# Patient Record
Sex: FEMALE | Race: BLACK OR AFRICAN AMERICAN | NOT HISPANIC OR LATINO | Employment: FULL TIME | URBAN - METROPOLITAN AREA
[De-identification: names, ages, dates, MRNs, and addresses within clinical notes are randomized per-mention and may not be internally consistent; named-entity substitution may affect disease eponyms.]

---

## 2018-01-02 ENCOUNTER — APPOINTMENT (OUTPATIENT)
Dept: WOMENS IMAGING | Facility: HOSPITAL | Age: 43
End: 2018-01-02

## 2018-01-02 PROCEDURE — 77063 BREAST TOMOSYNTHESIS BI: CPT | Performed by: RADIOLOGY

## 2018-01-02 PROCEDURE — 77067 SCR MAMMO BI INCL CAD: CPT | Performed by: RADIOLOGY

## 2019-03-26 ENCOUNTER — APPOINTMENT (OUTPATIENT)
Dept: WOMENS IMAGING | Facility: HOSPITAL | Age: 44
End: 2019-03-26

## 2019-03-26 PROCEDURE — 77067 SCR MAMMO BI INCL CAD: CPT | Performed by: RADIOLOGY

## 2019-03-26 PROCEDURE — 77063 BREAST TOMOSYNTHESIS BI: CPT | Performed by: RADIOLOGY

## 2020-03-25 ENCOUNTER — OFFICE VISIT (OUTPATIENT)
Dept: CARDIOLOGY | Age: 45
End: 2020-03-25

## 2020-03-25 VITALS
DIASTOLIC BLOOD PRESSURE: 85 MMHG | SYSTOLIC BLOOD PRESSURE: 157 MMHG | HEIGHT: 66 IN | BODY MASS INDEX: 36.16 KG/M2 | HEART RATE: 112 BPM | WEIGHT: 225 LBS

## 2020-03-25 DIAGNOSIS — R07.1 CHEST PAIN ON BREATHING: ICD-10-CM

## 2020-03-25 DIAGNOSIS — E78.5 HYPERLIPIDEMIA, UNSPECIFIED HYPERLIPIDEMIA TYPE: ICD-10-CM

## 2020-03-25 DIAGNOSIS — R93.1 ECHOCARDIOGRAM ABNORMAL: Primary | ICD-10-CM

## 2020-03-25 PROCEDURE — 99203 OFFICE O/P NEW LOW 30 MIN: CPT | Performed by: INTERNAL MEDICINE

## 2020-03-25 RX ORDER — PANTOPRAZOLE SODIUM 40 MG/1
TABLET, DELAYED RELEASE ORAL
COMMUNITY
Start: 2018-08-14

## 2020-03-25 RX ORDER — VALACYCLOVIR HYDROCHLORIDE 1 G/1
TABLET, FILM COATED ORAL AS NEEDED
COMMUNITY
Start: 2019-03-26

## 2020-03-25 RX ORDER — ETONOGESTREL AND ETHINYL ESTRADIOL 11.7; 2.7 MG/1; MG/1
INSERT, EXTENDED RELEASE VAGINAL
COMMUNITY
Start: 2018-07-23

## 2020-03-25 RX ORDER — DULOXETIN HYDROCHLORIDE 30 MG/1
30 CAPSULE, DELAYED RELEASE ORAL
COMMUNITY
Start: 2019-03-28 | End: 2020-03-27

## 2020-03-25 NOTE — PROGRESS NOTES
Subjective:        [facility]  Cardiology Consult Note    Patient Identification:  Name: Barbara Pacheco  Age: 44 y.o.  Sex: female  :  1975  MRN: [id number]             CC  SLEEP ALL THE TIME  FOOT SWELLING  H/O DM  SOB  OBESITY    STRONG F/H  HTN  DM FOR 25 YEARS  History of Present Illness:   44-year-old female here for the cardiac evaluation as well as establishment of the care as the patient complaining that the patient has been sleeping all the time, complains of the mild bilateral foot swelling    Patient has history of the diabetes moderately controlled    Barabra Pacheco has been complaining of the shortness of breath mild-to-moderate in intensity with mild-to-moderate usually relieved with rest associated with anxiety and fatigue    Also has obesity and hypertension    Comorbid cardiac risk factors:     Past Medical History:  Past Medical History:   Diagnosis Date   • Abnormal stress echo    • Allergic rhinitis    • Chronic kidney disease    • Diabetes mellitus type 1 (CMS/HCC)    • False positive cardiac stress test    • GERD (gastroesophageal reflux disease)    • HLD (hyperlipidemia)      Past Surgical History:  Past Surgical History:   Procedure Laterality Date   • CARDIAC CATHETERIZATION     •  SECTION     • MOUTH SURGERY        Allergies:  No Known Allergies  Home Meds:    (Not in a hospital admission)  Current Meds:   [unfilled]  Social History:   Social History     Tobacco Use   • Smoking status: Never Smoker   • Smokeless tobacco: Never Used   Substance Use Topics   • Alcohol use: Yes     Comment: occ      Family History:  Family History   Problem Relation Age of Onset   • Diabetes Mother    • Diabetes Father    • Heart attack Father    • Diabetes Sister    • Hyperlipidemia Paternal Grandfather    • Hypertension Paternal Grandfather         Review of Systems    Constitutional: No weakness,fatigue, fever, rigors, chills   Eyes: No vision changes, eye pain   ENT/oropharynx:  No difficulty swallowing, sore throat, epistaxis, changes in hearing   Cardiovascular: No chest pain, chest tightness, palpitations, paroxysmal nocturnal dyspnea, orthopnea, diaphoresis, dizziness / syncopal episode   Respiratory:  Moderate shortness of breath, dyspnea on exertion, cough, wheezing hemoptysis   Gastrointestinal: No abdominal pain, nausea, vomiting, diarrhea, bloody stools   Genitourinary: No hematuria, dysuria   Neurological: No headache, tremors, numbness,  one-sided weakness    Musculoskeletal: No cramps, myalgias,  joint pain, joint swelling   Integument: No rash, edema           Constitutional:  Heart Rate:  [112] 112  BP: (157)/(85) 157/85    Physical Exam   General:  Appears in no acute distress  Eyes: PERTL,  HEENT:  No JVD. Thyroid not visibly enlarged. No mucosal pallor or cyanosis  Respiratory: Respirations regular and unlabored at rest. BBS with good air entry in all fields. No crackles, rubs or wheezes auscultated  Cardiovascular: S1S2 Regular rate and rhythm. No murmur, rub or gallop auscultated. No carotid bruits. DP/PT pulses    . No pretibial pitting edema  Gastrointestinal: Abdomen soft, flat, non tender. Bowel sounds present. No hepatosplenomegaly. No ascites  Musculoskeletal: BLAKELY x4. No abnormal movements  Extremities: No digital clubbing or cyanosis  Skin: Color pink. Skin warm and dry to touch. No rashes  No xanthoma  Neuro: AAO x3 CN II-XII grossly intact          Procedures        Cardiographics  ECG:     Telemetry:    Echocardiogram:     Imaging  Chest X-ray:     Lab Review               @[email or web address]@              Assessment:/ Recommendations / Plan:   Patient Active Problem List   Diagnosis   • Echocardiogram abnormal   • Type 1 diabetes mellitus (CMS/HCC)   • Diabetic retinopathy associated with type 1 diabetes mellitus (CMS/HCC)   • Gastroesophageal reflux disease   • Hyperlipidemia   • Chest pain on breathing                    ICD-10-CM ICD-9-CM   1. Echocardiogram  abnormal R93.1 793.2   2. Hyperlipidemia, unspecified hyperlipidemia type E78.5 272.4   3. Chest pain on breathing R07.1 786.52     1. Echocardiogram abnormal  Considering patient's medical condition as well as the risk factors, patient will require echocardiogram for further evaluation for the LV function, four-chamber evaluation, including the pressures, valvular function and  pericardial disease and pericardial effusion    - Stress Test With Myocardial Perfusion One Day  - Adult Transthoracic Echo Complete W/ Cont if Necessary Per Protocol    2. Hyperlipidemia, unspecified hyperlipidemia type  Continue current treatment  - Stress Test With Myocardial Perfusion One Day  - Adult Transthoracic Echo Complete W/ Cont if Necessary Per Protocol    3. Chest pain on breathing  Considering the patient's symptoms as well as clinical situation and  EKG findings, along with cardiac risk factors, ischemic workup is necessary to rule out ischemic cardiomyopathy, stress induced arrhythmias, and functional capacity for diagnosis as well as prognostic consideration    - Stress Test With Myocardial Perfusion One Day  - Adult Transthoracic Echo Complete W/ Cont if Necessary Per Protocol       STRESS CARDIOLYTE, ECHO  START ZEBETA 5 MG PO DAILY    Pros and cons of this new medication / change medication has been explained to  the patient    Possible side effects has been explained    Associated need of the blood  Work has been explained    Need for the compliance of the medication has been explained      SEE IN 2-4 WKS    Labs/tests ordered for am      Mookie Alejandro MD  3/25/2020, 15:31      EMR Dragon/Transcription:   Dictated utilizing Dragon dictation

## 2020-03-26 RX ORDER — BISOPROLOL FUMARATE 5 MG/1
5 TABLET, FILM COATED ORAL DAILY
Qty: 30 TABLET | Refills: 6 | Status: SHIPPED | OUTPATIENT
Start: 2020-03-26 | End: 2020-10-05

## 2020-03-31 ENCOUNTER — TELEPHONE (OUTPATIENT)
Dept: CARDIOLOGY | Facility: CLINIC | Age: 45
End: 2020-03-31

## 2020-03-31 NOTE — TELEPHONE ENCOUNTER
Patient states that she has not started her new medicaiton. Patient states she is not having any chest pain or discomfort. Denies any shortness of breath. Reports fatigue only.  Instructed patient that given she is asymptomatic will need to reschedule stress testing given the current pandemic.  Strict instructions provided for any new symptoms.   ALP

## 2020-04-01 ENCOUNTER — APPOINTMENT (OUTPATIENT)
Dept: CARDIOLOGY | Facility: HOSPITAL | Age: 45
End: 2020-04-01

## 2020-04-02 ENCOUNTER — APPOINTMENT (OUTPATIENT)
Dept: CARDIOLOGY | Facility: HOSPITAL | Age: 45
End: 2020-04-02

## 2020-06-01 ENCOUNTER — HOSPITAL ENCOUNTER (OUTPATIENT)
Dept: CARDIOLOGY | Facility: HOSPITAL | Age: 45
Discharge: HOME | End: 2020-06-01
Admitting: INTERNAL MEDICINE

## 2020-06-01 ENCOUNTER — HOSPITAL ENCOUNTER (OUTPATIENT)
Dept: CARDIOLOGY | Facility: HOSPITAL | Age: 45
Discharge: HOME | End: 2020-06-01

## 2020-06-01 VITALS — BODY MASS INDEX: 36.16 KG/M2 | WEIGHT: 225 LBS | HEIGHT: 66 IN

## 2020-06-01 VITALS — SYSTOLIC BLOOD PRESSURE: 154 MMHG | DIASTOLIC BLOOD PRESSURE: 95 MMHG | HEART RATE: 113 BPM

## 2020-06-01 LAB
BH CV ECHO MEAS - ACS: 1.8 CM
BH CV ECHO MEAS - AO MAX PG (FULL): 5.1 MMHG
BH CV ECHO MEAS - AO MAX PG: 8.8 MMHG
BH CV ECHO MEAS - AO MEAN PG (FULL): 3.4 MMHG
BH CV ECHO MEAS - AO MEAN PG: 5.7 MMHG
BH CV ECHO MEAS - AO ROOT AREA (BSA CORRECTED): 1.3
BH CV ECHO MEAS - AO ROOT AREA: 6.1 CM^2
BH CV ECHO MEAS - AO ROOT DIAM: 2.8 CM
BH CV ECHO MEAS - AO V2 MAX: 148 CM/SEC
BH CV ECHO MEAS - AO V2 MEAN: 114.6 CM/SEC
BH CV ECHO MEAS - AO V2 VTI: 28.1 CM
BH CV ECHO MEAS - ASC AORTA: 2.3 CM
BH CV ECHO MEAS - AVA(I,A): 1.8 CM^2
BH CV ECHO MEAS - AVA(I,D): 1.8 CM^2
BH CV ECHO MEAS - AVA(V,A): 2.1 CM^2
BH CV ECHO MEAS - AVA(V,D): 2.1 CM^2
BH CV ECHO MEAS - BSA(HAYCOCK): 2.2 M^2
BH CV ECHO MEAS - BSA: 2.1 M^2
BH CV ECHO MEAS - BZI_BMI: 36.3 KILOGRAMS/M^2
BH CV ECHO MEAS - BZI_METRIC_HEIGHT: 167.6 CM
BH CV ECHO MEAS - BZI_METRIC_WEIGHT: 102.1 KG
BH CV ECHO MEAS - EDV(CUBED): 34.8 ML
BH CV ECHO MEAS - EDV(MOD-SP2): 61 ML
BH CV ECHO MEAS - EDV(MOD-SP4): 42 ML
BH CV ECHO MEAS - EDV(TEICH): 43 ML
BH CV ECHO MEAS - EF(CUBED): 73.1 %
BH CV ECHO MEAS - EF(MOD-BP): 64 %
BH CV ECHO MEAS - EF(MOD-SP2): 63.9 %
BH CV ECHO MEAS - EF(MOD-SP4): 64.3 %
BH CV ECHO MEAS - EF(TEICH): 66.2 %
BH CV ECHO MEAS - ESV(CUBED): 9.4 ML
BH CV ECHO MEAS - ESV(MOD-SP2): 22 ML
BH CV ECHO MEAS - ESV(MOD-SP4): 15 ML
BH CV ECHO MEAS - ESV(TEICH): 14.5 ML
BH CV ECHO MEAS - FS: 35.5 %
BH CV ECHO MEAS - IVS/LVPW: 0.91
BH CV ECHO MEAS - IVSD: 1 CM
BH CV ECHO MEAS - LA DIMENSION: 3.4 CM
BH CV ECHO MEAS - LA/AO: 1.2
BH CV ECHO MEAS - LAT PEAK E' VEL: 12.2 CM/SEC
BH CV ECHO MEAS - LV DIASTOLIC VOL/BSA (35-75): 20 ML/M^2
BH CV ECHO MEAS - LV MASS(C)D: 100.5 GRAMS
BH CV ECHO MEAS - LV MASS(C)DI: 47.8 GRAMS/M^2
BH CV ECHO MEAS - LV MAX PG: 3.6 MMHG
BH CV ECHO MEAS - LV MEAN PG: 2.3 MMHG
BH CV ECHO MEAS - LV SYSTOLIC VOL/BSA (12-30): 7.1 ML/M^2
BH CV ECHO MEAS - LV V1 MAX: 95.1 CM/SEC
BH CV ECHO MEAS - LV V1 MEAN: 72.2 CM/SEC
BH CV ECHO MEAS - LV V1 VTI: 16.2 CM
BH CV ECHO MEAS - LVIDD: 3.3 CM
BH CV ECHO MEAS - LVIDS: 2.1 CM
BH CV ECHO MEAS - LVLD AP2: 7.2 CM
BH CV ECHO MEAS - LVLD AP4: 7 CM
BH CV ECHO MEAS - LVLS AP2: 5.5 CM
BH CV ECHO MEAS - LVLS AP4: 5.2 CM
BH CV ECHO MEAS - LVOT AREA (M): 3.1 CM^2
BH CV ECHO MEAS - LVOT AREA: 3.2 CM^2
BH CV ECHO MEAS - LVOT DIAM: 2 CM
BH CV ECHO MEAS - LVPWD: 1.1 CM
BH CV ECHO MEAS - MED PEAK E' VEL: 5.8 CM/SEC
BH CV ECHO MEAS - MV A DUR: 0.15 SEC
BH CV ECHO MEAS - MV A MAX VEL: 121.7 CM/SEC
BH CV ECHO MEAS - MV DEC SLOPE: 522.8 CM/SEC^2
BH CV ECHO MEAS - MV DEC TIME: 0.13 SEC
BH CV ECHO MEAS - MV E MAX VEL: 82.9 CM/SEC
BH CV ECHO MEAS - MV E/A: 0.68
BH CV ECHO MEAS - MV MAX PG: 6.3 MMHG
BH CV ECHO MEAS - MV MEAN PG: 3.4 MMHG
BH CV ECHO MEAS - MV P1/2T MAX VEL: 93 CM/SEC
BH CV ECHO MEAS - MV P1/2T: 52.1 MSEC
BH CV ECHO MEAS - MV V2 MAX: 125.1 CM/SEC
BH CV ECHO MEAS - MV V2 MEAN: 86.4 CM/SEC
BH CV ECHO MEAS - MV V2 VTI: 19.2 CM
BH CV ECHO MEAS - MVA P1/2T LCG: 2.4 CM^2
BH CV ECHO MEAS - MVA(P1/2T): 4.2 CM^2
BH CV ECHO MEAS - MVA(VTI): 2.7 CM^2
BH CV ECHO MEAS - PA ACC TIME: 0.12 SEC
BH CV ECHO MEAS - PA MAX PG (FULL): 2.5 MMHG
BH CV ECHO MEAS - PA MAX PG: 5 MMHG
BH CV ECHO MEAS - PA PR(ACCEL): 26.7 MMHG
BH CV ECHO MEAS - PA V2 MAX: 111.3 CM/SEC
BH CV ECHO MEAS - PULM A REVS DUR: 0.12 SEC
BH CV ECHO MEAS - PULM A REVS VEL: 35.9 CM/SEC
BH CV ECHO MEAS - PULM DIAS VEL: 30.1 CM/SEC
BH CV ECHO MEAS - PULM S/D: 2.1
BH CV ECHO MEAS - PULM SYS VEL: 63.5 CM/SEC
BH CV ECHO MEAS - PVA(V,A): 2.7 CM^2
BH CV ECHO MEAS - PVA(V,D): 2.7 CM^2
BH CV ECHO MEAS - QP/QS: 1.1
BH CV ECHO MEAS - RV BASE (<4.1) - OBSOLETE: 2.5 CM
BH CV ECHO MEAS - RV LENGTH (<8.5) - OBSOLETE: 6 CM
BH CV ECHO MEAS - RV MAX PG: 2.4 MMHG
BH CV ECHO MEAS - RV MEAN PG: 1.5 MMHG
BH CV ECHO MEAS - RV V1 MAX: 78.1 CM/SEC
BH CV ECHO MEAS - RV V1 MEAN: 58.4 CM/SEC
BH CV ECHO MEAS - RV V1 VTI: 14.6 CM
BH CV ECHO MEAS - RVOT AREA: 3.8 CM^2
BH CV ECHO MEAS - RVOT DIAM: 2.2 CM
BH CV ECHO MEAS - SI(AO): 81.9 ML/M^2
BH CV ECHO MEAS - SI(CUBED): 12.1 ML/M^2
BH CV ECHO MEAS - SI(LVOT): 24.6 ML/M^2
BH CV ECHO MEAS - SI(MOD-SP2): 18.5 ML/M^2
BH CV ECHO MEAS - SI(MOD-SP4): 12.8 ML/M^2
BH CV ECHO MEAS - SI(TEICH): 13.6 ML/M^2
BH CV ECHO MEAS - SV(AO): 172.3 ML
BH CV ECHO MEAS - SV(CUBED): 25.5 ML
BH CV ECHO MEAS - SV(LVOT): 51.7 ML
BH CV ECHO MEAS - SV(MOD-SP2): 39 ML
BH CV ECHO MEAS - SV(MOD-SP4): 27 ML
BH CV ECHO MEAS - SV(RVOT): 55.6 ML
BH CV ECHO MEAS - SV(TEICH): 28.5 ML
BH CV ECHO MEAS - TAPSE (>1.6): 2 CM
BH CV ECHO MEASUREMENTS AVERAGE E/E' RATIO: 9.21
BH CV XLRA - RV BASE: 2.5 CM
BH CV XLRA - RV LENGTH: 6 CM
BH CV XLRA - RV MID: 2.4 CM
BH CV XLRA - TDI S': 15.4 CM/SEC
LEFT ATRIUM VOLUME INDEX: 13 ML/M2
MAXIMAL PREDICTED HEART RATE: 175 BPM
STRESS TARGET HR: 149 BPM

## 2020-06-01 PROCEDURE — A9500 TC99M SESTAMIBI: HCPCS | Performed by: INTERNAL MEDICINE

## 2020-06-01 PROCEDURE — 93017 CV STRESS TEST TRACING ONLY: CPT

## 2020-06-01 PROCEDURE — 93016 CV STRESS TEST SUPVJ ONLY: CPT | Performed by: INTERNAL MEDICINE

## 2020-06-01 PROCEDURE — 93306 TTE W/DOPPLER COMPLETE: CPT

## 2020-06-01 PROCEDURE — 78452 HT MUSCLE IMAGE SPECT MULT: CPT | Performed by: INTERNAL MEDICINE

## 2020-06-01 PROCEDURE — 0 TECHNETIUM SESTAMIBI: Performed by: INTERNAL MEDICINE

## 2020-06-01 PROCEDURE — 78452 HT MUSCLE IMAGE SPECT MULT: CPT

## 2020-06-01 PROCEDURE — 93306 TTE W/DOPPLER COMPLETE: CPT | Performed by: INTERNAL MEDICINE

## 2020-06-01 PROCEDURE — 93018 CV STRESS TEST I&R ONLY: CPT | Performed by: INTERNAL MEDICINE

## 2020-06-01 RX ORDER — CHLORHEXIDINE GLUCONATE 0.12 MG/ML
RINSE ORAL
Status: ON HOLD | COMMUNITY
Start: 2020-04-07 | End: 2022-09-29

## 2020-06-01 RX ORDER — DULOXETIN HYDROCHLORIDE 30 MG/1
30 CAPSULE, DELAYED RELEASE ORAL 2 TIMES DAILY
COMMUNITY
Start: 2020-05-07 | End: 2021-10-13

## 2020-06-01 RX ORDER — PROCHLORPERAZINE 25 MG/1
SUPPOSITORY RECTAL SEE ADMIN INSTRUCTIONS
COMMUNITY
Start: 2020-03-31

## 2020-06-01 RX ORDER — AMOXICILLIN 500 MG/1
CAPSULE ORAL
COMMUNITY
Start: 2020-04-07 | End: 2020-06-01

## 2020-06-01 RX ORDER — AMLODIPINE BESYLATE 5 MG/1
TABLET ORAL
COMMUNITY
Start: 2020-04-27 | End: 2020-12-04

## 2020-06-01 RX ORDER — PROCHLORPERAZINE 25 MG/1
SUPPOSITORY RECTAL
COMMUNITY
Start: 2020-03-31

## 2020-06-01 RX ADMIN — TECHNETIUM TC 99M SESTAMIBI 1 DOSE: 1 INJECTION INTRAVENOUS at 11:15

## 2020-06-01 RX ADMIN — TECHNETIUM TC 99M SESTAMIBI 1 DOSE: 1 INJECTION INTRAVENOUS at 08:12

## 2020-06-04 LAB
BH CV STRESS BP STAGE 1: NORMAL
BH CV STRESS BP STAGE 2: NORMAL
BH CV STRESS DURATION MIN STAGE 1: 3
BH CV STRESS DURATION SEC STAGE 1: 0
BH CV STRESS DURATION SEC STAGE 2: 52
BH CV STRESS GRADE STAGE 1: 10
BH CV STRESS GRADE STAGE 2: 10
BH CV STRESS HR STAGE 1: 153
BH CV STRESS HR STAGE 2: 153
BH CV STRESS METS STAGE 1: 4.6
BH CV STRESS METS STAGE 2: 4.8
BH CV STRESS PROTOCOL 1: NORMAL
BH CV STRESS RECOVERY BP: NORMAL MMHG
BH CV STRESS RECOVERY HR: 121 BPM
BH CV STRESS RECOVERY O2: 100 %
BH CV STRESS SPEED STAGE 1: 1.7
BH CV STRESS SPEED STAGE 2: 2
BH CV STRESS STAGE 1: 1
BH CV STRESS STAGE 2: 2
LV EF NUC BP: 60 %
MAXIMAL PREDICTED HEART RATE: 175 BPM
PERCENT MAX PREDICTED HR: 87.43 %
STRESS BASELINE BP: NORMAL MMHG
STRESS BASELINE HR: 112 BPM
STRESS O2 SAT REST: 100 %
STRESS PERCENT HR: 103 %
STRESS POST ESTIMATED WORKLOAD: 4.8 METS
STRESS POST EXERCISE DUR MIN: 3 MIN
STRESS POST EXERCISE DUR SEC: 52 SEC
STRESS POST PEAK BP: NORMAL MMHG
STRESS POST PEAK HR: 153 BPM
STRESS TARGET HR: 149 BPM

## 2020-06-08 ENCOUNTER — OFFICE VISIT (OUTPATIENT)
Dept: CARDIOLOGY | Facility: CLINIC | Age: 45
End: 2020-06-08

## 2020-06-08 VITALS — HEIGHT: 66 IN | WEIGHT: 225 LBS | BODY MASS INDEX: 36.16 KG/M2

## 2020-06-08 DIAGNOSIS — R00.2 PALPITATIONS: ICD-10-CM

## 2020-06-08 DIAGNOSIS — R07.1 CHEST PAIN ON BREATHING: Primary | ICD-10-CM

## 2020-06-08 DIAGNOSIS — I10 ESSENTIAL HYPERTENSION: ICD-10-CM

## 2020-06-08 PROCEDURE — 99442 PR PHYS/QHP TELEPHONE EVALUATION 11-20 MIN: CPT | Performed by: INTERNAL MEDICINE

## 2020-06-08 NOTE — PROGRESS NOTES
You have chosen to receive care through a telephone visit. Do you consent to use a telephone visit for your medical care today? Yes    Results     Subjective:        Barbara Pacheco is a 45 y.o. female who here for follow up    CC  Telephone checkup for the results    Fast heart beat better    Still have fatigue  HPI  45-year-old female with a history of palpitation benign essential arterial hypertension has heart rate beating much better denies any chest pains or tightness in chest     Problem List Items Addressed This Visit        Cardiovascular and Mediastinum    Essential hypertension    Palpitations       Nervous and Auditory    Chest pain on breathing - Primary        .    The following portions of the patient's history were reviewed and updated as appropriate: allergies, current medications, past family history, past medical history, past social history, past surgical history and problem list.    Past Medical History:   Diagnosis Date   • Abnormal stress echo    • Allergic rhinitis    • Chronic kidney disease    • Diabetes mellitus type 1 (CMS/HCC)    • False positive cardiac stress test    • GERD (gastroesophageal reflux disease)    • HLD (hyperlipidemia)      reports that she has never smoked. She has never used smokeless tobacco. She reports that she drinks alcohol. She reports that she does not use drugs.   Family History   Problem Relation Age of Onset   • Diabetes Mother    • Diabetes Father    • Heart attack Father    • Hypertension Father    • Diabetes Sister    • Hyperlipidemia Paternal Grandfather    • Hypertension Paternal Grandfather        Review of Systems  Constitutional: No wt loss, fever, fatigue  Gastrointestinal: No nausea, abdominal pain  Behavioral/Psych: No insomnia or anxiety   Cardiovascular no chest pains or tightness in the chest  Objective:       Physical Exam  Telephone conference only      Procedures      Echocardiogram:        Current Outpatient Medications:   •  amLODIPine  (NORVASC) 5 MG tablet, TAKE 1 TABLET BY MOUTH EVERY DAY, Disp: , Rfl:   •  Biotin 5 MG capsule, Take  by mouth., Disp: , Rfl:   •  bisoprolol (Zebeta) 5 MG tablet, Take 1 tablet by mouth Daily., Disp: 30 tablet, Rfl: 6  •  chlorhexidine (PERIDEX) 0.12 % solution, USE TWICE DAILY ON A COTTON BALL AS DIRECTED, Disp: , Rfl:   •  Continuous Blood Gluc Sensor (DEXCOM G6 SENSOR), USE AS DIRECTED AND CHANGE EVERY 10 DAYS, Disp: , Rfl:   •  Continuous Blood Gluc Transmit (DEXCOM G6 TRANSMITTER) misc, See Admin Instructions., Disp: , Rfl:   •  DULoxetine (CYMBALTA) 30 MG capsule, Take 30 mg by mouth 2 (Two) Times a Day., Disp: , Rfl:   •  etonogestrel-ethinyl estradiol (NuvaRing) 0.12-0.015 MG/24HR vaginal ring, , Disp: , Rfl:   •  insulin aspart (NovoLOG FlexPen) 100 UNIT/ML solution pen-injector sc pen, , Disp: , Rfl:   •  insulin glargine (LANTUS) 100 UNIT/ML injection, Inject 60 Units under the skin daily., Disp: , Rfl:   •  Insulin Infusion Pump device, Inject 2.7 Units/hr under the skin., Disp: , Rfl:   •  pantoprazole (PROTONIX) 40 MG EC tablet, , Disp: , Rfl:   •  pravastatin (PRAVACHOL) 20 MG tablet, Take 20 mg by mouth., Disp: , Rfl:   •  prenatal vitamins (PRENATAL 27-1) 27-1 MG tablet tablet, Take  by mouth., Disp: , Rfl:   •  valACYclovir (VALTREX) 1000 MG tablet, , Disp: , Rfl:   •  valsartan-hydrochlorothiazide (DIOVAN-HCT) 160-12.5 MG per tablet, , Disp: , Rfl: 2   Assessment:        Patient Active Problem List   Diagnosis   • Echocardiogram abnormal   • Type 1 diabetes mellitus (CMS/HCC)   • Diabetic retinopathy associated with type 1 diabetes mellitus (CMS/HCC)   • Gastroesophageal reflux disease   • Hyperlipidemia   • Chest pain on breathing   Interpretation Summary        · Moderate risk for ischemic heart disease.  · Findings consistent with a normal ECG stress test.  · Left ventricular ejection fraction is normal (Calculated EF = 60%).  · Myocardial perfusion imaging indicates a normal myocardial  perfusion study with no evidence of ischemia.  · Impressions are consistent with a low risk study.       Interpretation Summary     · Calculated EF = 64%  · There is no evidence of pericardial effusion.                  Plan:            ICD-10-CM ICD-9-CM   1. Chest pain on breathing R07.1 786.52   2. Essential hypertension I10 401.9   3. Palpitations R00.2 785.1     1. Chest pain on breathing  Atypical    2. Essential hypertension  Blood pressure under control    3. Palpitations  Heart rate much better       Specificity and sensitivity of the stress test/ cardiac workup has been explained. Pt has been explained if  Symptoms continue please go to ER, and further w/p will be required.    Also explained this does not rule out coronary artery disease or the future events, continue to emphasize on risk reductions for coronary artery disease    Pt also advised to contact PCP for other causes of symptoms      This patient has consented to a telehealth visit via telephone. The visit was scheduled as a telephone visit to comply with patient safety concerns in accordance with CDC recommendations.  All vitals recorded within this visit are reported by the patient.  I spent 15 minutes in total including but not limited to the 15 minutes spent in direct conversation with this patient.     6 months  COUNSELING:    Barbara Antonio was given to patient for the following topics: diagnostic results, risk factor reductions, impressions, risks and benefits of treatment options and importance of treatment compliance .       SMOKING COUNSELING:    [unfilled]    Dictated using Dragon dictation

## 2020-08-21 ENCOUNTER — APPOINTMENT (OUTPATIENT)
Dept: WOMENS IMAGING | Facility: HOSPITAL | Age: 45
End: 2020-08-21

## 2020-08-21 PROCEDURE — 77067 SCR MAMMO BI INCL CAD: CPT | Performed by: RADIOLOGY

## 2020-08-21 PROCEDURE — 77063 BREAST TOMOSYNTHESIS BI: CPT | Performed by: RADIOLOGY

## 2020-10-05 RX ORDER — BISOPROLOL FUMARATE 5 MG/1
TABLET, FILM COATED ORAL
Qty: 90 TABLET | Refills: 1 | Status: SHIPPED | OUTPATIENT
Start: 2020-10-05 | End: 2021-01-15 | Stop reason: SDUPTHER

## 2021-01-15 ENCOUNTER — OFFICE VISIT (OUTPATIENT)
Dept: CARDIOLOGY | Facility: CLINIC | Age: 46
End: 2021-01-15

## 2021-01-15 VITALS
HEIGHT: 66 IN | SYSTOLIC BLOOD PRESSURE: 127 MMHG | DIASTOLIC BLOOD PRESSURE: 78 MMHG | BODY MASS INDEX: 38.25 KG/M2 | HEART RATE: 84 BPM | WEIGHT: 238 LBS

## 2021-01-15 DIAGNOSIS — E78.5 HYPERLIPIDEMIA, UNSPECIFIED HYPERLIPIDEMIA TYPE: ICD-10-CM

## 2021-01-15 DIAGNOSIS — I10 ESSENTIAL HYPERTENSION: Primary | ICD-10-CM

## 2021-01-15 DIAGNOSIS — R00.2 PALPITATIONS: ICD-10-CM

## 2021-01-15 PROCEDURE — 99213 OFFICE O/P EST LOW 20 MIN: CPT | Performed by: NURSE PRACTITIONER

## 2021-01-15 RX ORDER — NYSTATIN AND TRIAMCINOLONE ACETONIDE 100000; 1 [USP'U]/G; MG/G
OINTMENT TOPICAL
COMMUNITY
Start: 2020-12-20

## 2021-01-15 RX ORDER — .BETA.-CAROTENE, ASCORBIC ACID, CHOLECALCIFEROL, .ALPHA.-TOCOPHEROL ACETATE, D-, THIAMINE MONONITRATE, RIBOFLAVIN, NIACINAMIDE, PYRIDOXINE HYDROCHLORIDE, FOLIC ACID, CYANOCOBALAMIN, CALCIUM CARBONATE, IRON, MAGNESIUM OXIDE, ZINC OXIDE, CUPRIC OXIDE, IODINE, OMEGA-3 FATTY ACIDS 25-1-400MG
KIT ORAL
COMMUNITY
Start: 2020-11-06 | End: 2021-10-13

## 2021-01-15 RX ORDER — DESONIDE 0.5 MG/G
CREAM TOPICAL
COMMUNITY
Start: 2020-10-13

## 2021-01-15 RX ORDER — HALOBETASOL PROPIONATE AND TAZAROTENE .1; .45 MG/G; MG/G
LOTION TOPICAL
COMMUNITY
Start: 2020-10-13 | End: 2021-10-13

## 2021-01-15 RX ORDER — INSULIN GLARGINE 100 [IU]/ML
60 INJECTION, SOLUTION SUBCUTANEOUS
COMMUNITY

## 2021-01-15 RX ORDER — BISOPROLOL FUMARATE 5 MG/1
5 TABLET, FILM COATED ORAL DAILY
Qty: 90 TABLET | Refills: 1 | Status: SHIPPED | OUTPATIENT
Start: 2021-01-15 | End: 2021-08-13 | Stop reason: SDUPTHER

## 2021-01-15 RX ORDER — FLUCONAZOLE 200 MG/1
TABLET ORAL
COMMUNITY
Start: 2020-12-20 | End: 2021-10-13

## 2021-01-15 NOTE — PROGRESS NOTES
Subjective:        Barbara Pacheco is a 45 y.o. female who here for follow up    No chief complaint on file.  Hyperlipidemia    HPI     Barbara Pacheco is a 45-year-old female, who is new to me.  She has a history which includes GERD, palpitations, hypertension, hyperlipidemia, diabetes mellitus, CKD, and abnormal stress echo.  His echo on 6/1/2020 revealed EF 64%, no evidence of pericardial effusion.  Stress test indicated a normal myocardial perfusion study with no evidence of ischemia       Tthe following portions of the patient's history were reviewed and updated as appropriate: allergies, current medications, past family history, past medical history, past social history, past surgical history and problem list.    Past Medical History:   Diagnosis Date   • Abnormal stress echo    • Allergic rhinitis    • Chronic kidney disease    • Diabetes mellitus type 1 (CMS/HCC)    • False positive cardiac stress test    • GERD (gastroesophageal reflux disease)    • HLD (hyperlipidemia)          reports that she has never smoked. She has never used smokeless tobacco. She reports current alcohol use. She reports that she does not use drugs.     Family History   Problem Relation Age of Onset   • Diabetes Mother    • Diabetes Father    • Heart attack Father    • Hypertension Father    • Diabetes Sister    • Hyperlipidemia Paternal Grandfather    • Hypertension Paternal Grandfather        ROS     Review of Systems  Constitutional: No wt loss, fever, fatigue  Gastrointestinal: No nausea, abdominal pain  Behavioral/Psych: No insomnia or anxiety  Cardiovascular  :Denies chest pain, and shortness      Objective:           Vitals signs and nursing note reviewed.   Constitutional:       Appearance: Well-developed.   HENT:      Head: Normocephalic.      Right Ear: External ear normal.      Left Ear: External ear normal.   Neck:      Musculoskeletal: Normal range of motion.      Vascular: No JVD.   Pulmonary:      Effort: Pulmonary effort  is normal. No respiratory distress.      Breath sounds: Normal breath sounds. No stridor. No rales.   Cardiovascular:      Normal rate. Regular rhythm.      No gallop.   Pulses:     Intact distal pulses.   Abdominal:      General: Bowel sounds are normal. There is no distension.      Palpations: Abdomen is soft.      Tenderness: There is no abdominal tenderness. There is no guarding.   Musculoskeletal: Normal range of motion.         General: No tenderness.   Skin:     General: Skin is warm.   Neurological:      Mental Status: Alert and oriented to person, place, and time.      Deep Tendon Reflexes: Reflexes are normal and symmetric.   Psychiatric:         Judgment: Judgment normal.         Procedures    Interpretation Summary       · Moderate risk for ischemic heart disease.  · Findings consistent with a normal ECG stress test.  · Left ventricular ejection fraction is normal (Calculated EF = 60%).  · Myocardial perfusion imaging indicates a normal myocardial perfusion study with no evidence of ischemia.  · Impressions are consistent with a low risk study.     Asymptomatic for chest pain. ECG is negative for ischemia.   Ectopy:none   B/P: Baseline Hypertensive  134/90, Peak 160/78  Recovery:  1 min 160/80  2 min 160/80  3 min 148/78  4 min 146/84  5 min  141/91  6 min  130/84     HR response: baseline Tachycardia.  , Peak 155 (within 3 minutes), Recovery 151- 121  Beta-blocker therapy prescribed, however patient has not started yet, but Will start today.   Exercise Tolerance: poor  [id number]  Of  20 Benson Scale, 3.5 minutes.     Duke treadmill score 2  Estimates an annual cardiovascular mortality of 1 %  And of 5-year survival of 93 % . Using the Duke score there is an intermediate probability of angiographic coronary disease     Supervised by: Dr. Alejandro        Interpretation Summary    · Calculated EF = 64%  · There is no evidence of pericardial effusion.            Current Outpatient Medications:   •   Biotin 5 MG capsule, Take  by mouth., Disp: , Rfl:   •  bisoprolol (ZEBeta) 5 MG tablet, TAKE 1 TABLET BY MOUTH EVERY DAY, Disp: 90 tablet, Rfl: 1  •  Cetirizine HCl 10 MG capsule, Take 1 tablet by mouth Daily., Disp: , Rfl:   •  chlorhexidine (PERIDEX) 0.12 % solution, USE TWICE DAILY ON A COTTON BALL AS DIRECTED, Disp: , Rfl:   •  Continuous Blood Gluc Sensor (DEXCOM G6 SENSOR), USE AS DIRECTED AND CHANGE EVERY 10 DAYS, Disp: , Rfl:   •  Continuous Blood Gluc Transmit (DEXCOM G6 TRANSMITTER) misc, See Admin Instructions., Disp: , Rfl:   •  desonide (DESOWEN) 0.05 % cream, , Disp: , Rfl:   •  DULoxetine (CYMBALTA) 30 MG capsule, Take 30 mg by mouth 2 (Two) Times a Day., Disp: , Rfl:   •  Duobrii 0.01-0.045 % lotion, , Disp: , Rfl:   •  etonogestrel-ethinyl estradiol (NuvaRing) 0.12-0.015 MG/24HR vaginal ring, , Disp: , Rfl:   •  fluconazole (DIFLUCAN) 200 MG tablet, TAKE ONE TABLET BY MOUTH EVERY OTHER DAY FOR 3 DOSES, Disp: , Rfl:   •  insulin aspart (NovoLOG FlexPen) 100 UNIT/ML solution pen-injector sc pen, , Disp: , Rfl:   •  insulin glargine (LANTUS, SEMGLEE) 100 UNIT/ML injection, Inject 60 Units under the skin into the appropriate area as directed., Disp: , Rfl:   •  Insulin Infusion Pump device, Inject 2.7 Units/hr under the skin., Disp: , Rfl:   •  nystatin-triamcinolone (MYCOLOG) 233137-9.1 UNIT/GM-% ointment, APPLY TWICE DAILY X 4 5DAYS, THEN ONCE DAILY X4 5 DAYS, THEN EVERY OTHER DAY X 4 5D, Disp: , Rfl:   •  pantoprazole (PROTONIX) 40 MG EC tablet, , Disp: , Rfl:   •  pravastatin (PRAVACHOL) 20 MG tablet, Take 20 mg by mouth., Disp: , Rfl:   •  Vjpthp-PcEfoe-Ffsiu-FA-Omega 3 (Duet ) 25-1 & 400 MG misc, 1 OF EACH DAILY, Disp: , Rfl:   •  prenatal vitamins (PRENATAL 27-1) 27-1 MG tablet tablet, Take  by mouth., Disp: , Rfl:   •  progesterone (PROMETRIUM) 100 MG capsule, TAKE 1 2 CAPSULES (100 200 MG) BY ORAL ROUTE ONCE DAILY IN THE EVENING, Disp: , Rfl:   •  valACYclovir (VALTREX) 1000 MG tablet,  , Disp: , Rfl:   •  valsartan-hydrochlorothiazide (DIOVAN-HCT) 160-12.5 MG per tablet, , Disp: , Rfl: 2  •  vitamin D (ERGOCALCIFEROL) 1.25 MG (08478 UT) capsule capsule, Take 50,000 Units by mouth., Disp: , Rfl:      Assessment:        Patient Active Problem List   Diagnosis   • Echocardiogram abnormal   • Type 1 diabetes mellitus (CMS/HCC)   • Diabetic retinopathy associated with type 1 diabetes mellitus (CMS/HCC)   • Gastroesophageal reflux disease   • Hyperlipidemia   • Chest pain on breathing   • Essential hypertension   • Palpitations               Plan:   1.  Hypertension: Blood pressures controlled on current medications.  Refilled her beta-blocker.    Educated patient on exercising for at least 30 minutes a day for 2 to 3 days a week. Importance of controlling hypertension and blood pressure checkup on the regular basis has been explained. Hypertension as a silent killer has been discussed. Risk reduction of the weight and regular exercises to control the hypertension has been explained.    2.  Palpitations: Controlled             No diagnosis found.    There are no diagnoses linked to this encounter.    COUNSELING: Jairo Antonio was given to patient for the following topics: diagnostic results, risk factor reductions, impressions, risks and benefits of treatment options and importance of treatment compliance .       SMOKING COUNSELING: Denies    She will follow-up in 1 year, unless she needs to be seen sooner.    Sincerely,   MARCELA Vega  [facility]  01/15/21  12:04 EST     EMR Dragon/Transcription disclaimer:   Much of this encounter note is an electronic transcription/translation of spoken language to printed text. The electronic translation of spoken language may permit erroneous, or at times, nonsensical words or phrases to be inadvertently transcribed; Although I have reviewed the note for such errors, some may still exist.

## 2021-08-13 RX ORDER — BISOPROLOL FUMARATE 5 MG/1
5 TABLET, FILM COATED ORAL DAILY
Qty: 90 TABLET | Refills: 1 | OUTPATIENT
Start: 2021-08-13

## 2021-08-13 RX ORDER — BISOPROLOL FUMARATE 5 MG/1
5 TABLET, FILM COATED ORAL DAILY
Qty: 90 TABLET | Refills: 1 | Status: SHIPPED | OUTPATIENT
Start: 2021-08-13 | End: 2022-02-23 | Stop reason: SDUPTHER

## 2021-09-14 ENCOUNTER — APPOINTMENT (OUTPATIENT)
Dept: WOMENS IMAGING | Facility: HOSPITAL | Age: 46
End: 2021-09-14

## 2021-09-14 PROCEDURE — 77067 SCR MAMMO BI INCL CAD: CPT | Performed by: RADIOLOGY

## 2021-09-14 PROCEDURE — 77063 BREAST TOMOSYNTHESIS BI: CPT | Performed by: RADIOLOGY

## 2021-10-11 PROBLEM — E10.40 DIABETIC NEUROPATHY ASSOCIATED WITH TYPE 1 DIABETES MELLITUS (HCC): Status: ACTIVE | Noted: 2020-06-24

## 2021-10-11 PROBLEM — M19.90 ARTHRITIS: Status: ACTIVE | Noted: 2017-08-16

## 2021-10-11 PROBLEM — K76.9 LESION OF LIVER: Status: ACTIVE | Noted: 2017-11-28

## 2021-10-11 PROBLEM — R76.8 POSITIVE ANA (ANTINUCLEAR ANTIBODY): Status: ACTIVE | Noted: 2021-08-23

## 2021-10-11 PROBLEM — Z80.0 FAMILY HISTORY OF MALIGNANT NEOPLASM OF DIGESTIVE ORGANS: Status: ACTIVE | Noted: 2017-08-16

## 2021-10-11 PROBLEM — K20.90 ESOPHAGITIS DETERMINED BY BIOPSY: Status: ACTIVE | Noted: 2021-10-11

## 2021-10-11 PROBLEM — E11.40 DIABETIC NEUROPATHY: Status: ACTIVE | Noted: 2020-06-24

## 2021-10-11 PROBLEM — L40.50 PSORIATIC ARTHRITIS: Status: ACTIVE | Noted: 2021-08-23

## 2021-10-11 PROBLEM — K44.9 HIATAL HERNIA: Status: ACTIVE | Noted: 2018-08-14

## 2021-10-11 PROBLEM — I10 HYPERTENSIVE DISORDER: Status: ACTIVE | Noted: 2017-08-16

## 2021-10-11 PROBLEM — E10.22: Status: ACTIVE | Noted: 2021-04-21

## 2021-10-11 PROBLEM — Z96.41 PRESENCE OF INSULIN PUMP: Status: ACTIVE | Noted: 2021-04-21

## 2021-10-12 NOTE — PROGRESS NOTES
Chief Complaint   Patient presents with   • GI Problem         History of Present Illness  Patient is a 46-year-old female who presents today for follow-up. She has a history of esophagitis, cholelithiasis, liver hemangioma.  Most recent EGD was performed March 2019.  Most recent colonoscopy was October 2017.  She has a family history of colon cancer.    Patient presents today for follow-up.  She reports she has been experiencing persistent worsening abdominal bloating.  This has been present over the last 2 years and is constant.  She reports her stomach feels hard and distended.  She denies any significant abdominal pain, nausea or vomiting.  Eating does not seem to make it better or worse.    She reports her bowels have been moving regularly, generally multiple times per day.  She generally goes 2-3 times per day.  She denies any blood in the stool or dark stool.    She continues on pantoprazole for GERD.  She reports she has symptoms intermittently.  Generally symptoms are controlled if she takes the medication but recur promptly when she discontinues it.    She has a history of diabetes, most recent A1c was around 10.  She reports she was also recently found to have iron deficiency. Per review of lab work, CBC was normal August 2021 with hemoglobin of 12.6 however iron level was low at 46.  She had a CT scan performed in 2017 that showed suspected gallbladder stones or sludge.  It also showed liver lesions which were followed up on in 2018 and 2019 and remained stable, felt to be hemangiomas.  Gastric emptying study in 2018 was normal.    You have chosen to receive care through a telephone visit.   Do you consent to use a telephone visit for your medical care today? Yes         Result Review :          Physical Exam - TELEPHONE VISIT      Assessment and Plan    Diagnoses and all orders for this visit:    1. Bloating (Primary)  -     Breath Hydrogen Test; Future    2. Esophagitis    3. Iron deficiency    4.  Gastroesophageal reflux disease with esophagitis without hemorrhage    5. Family history of colon cancer         Discussion  Patient presents today with concerns about worsening abdominal bloating.  She was also recently found to be iron deficient.  She is having some persistent reflux symptoms.  Discussed recommendation for updated EGD and colonoscopy to rule out any source of GI blood loss as well as allow for biopsies to assess for celiac disease.  This will also allow for follow-up of her esophagitis.  Also recommended hydrogen breath test to evaluate for small intestinal bacterial overgrowth which could be contributing to her abdominal bloating.  If this testing is negative and her symptoms persist, we may need to consider updated imaging or consider updated gastric emptying study to evaluate for gastroparesis.      This visit has been rescheduled as a phone visit to comply with patient safety concerns in accordance with CDC recommendations. Total time of discussion was 10 minutes.    Follow Up   Return for Follow up to review results after testing complete.    Patient Instructions   Schedule EGD and colonoscopy for further evaluation.    Schedule hydrogen breath test to assess for small intestinal bacterial overgrowth.    Recommend starting a daily probiotic.  Recommend Align or [employer] available over-the-counter.  Take 1 capsule daily with food.

## 2021-10-13 ENCOUNTER — OFFICE VISIT (OUTPATIENT)
Dept: GASTROENTEROLOGY | Facility: CLINIC | Age: 46
End: 2021-10-13

## 2021-10-13 ENCOUNTER — PREP FOR SURGERY (OUTPATIENT)
Dept: SURGERY | Facility: SURGERY CENTER | Age: 46
End: 2021-10-13

## 2021-10-13 DIAGNOSIS — R14.0 BLOATING: Primary | ICD-10-CM

## 2021-10-13 DIAGNOSIS — E61.1 IRON DEFICIENCY: ICD-10-CM

## 2021-10-13 DIAGNOSIS — K21.00 GASTROESOPHAGEAL REFLUX DISEASE WITH ESOPHAGITIS WITHOUT HEMORRHAGE: ICD-10-CM

## 2021-10-13 DIAGNOSIS — Z80.0 FAMILY HISTORY OF COLON CANCER: ICD-10-CM

## 2021-10-13 DIAGNOSIS — Z12.11 ENCOUNTER FOR SCREENING FOR MALIGNANT NEOPLASM OF COLON: ICD-10-CM

## 2021-10-13 DIAGNOSIS — K20.90 ESOPHAGITIS: ICD-10-CM

## 2021-10-13 PROCEDURE — 99441 PR PHYS/QHP TELEPHONE EVALUATION 5-10 MIN: CPT | Performed by: NURSE PRACTITIONER

## 2021-10-13 RX ORDER — SODIUM CHLORIDE 0.9 % (FLUSH) 0.9 %
10 SYRINGE (ML) INJECTION AS NEEDED
Status: CANCELLED | OUTPATIENT
Start: 2021-10-13

## 2021-10-13 RX ORDER — SODIUM CHLORIDE 0.9 % (FLUSH) 0.9 %
3 SYRINGE (ML) INJECTION EVERY 12 HOURS SCHEDULED
Status: CANCELLED | OUTPATIENT
Start: 2021-10-13

## 2021-10-13 RX ORDER — SODIUM CHLORIDE, SODIUM LACTATE, POTASSIUM CHLORIDE, CALCIUM CHLORIDE 600; 310; 30; 20 MG/100ML; MG/100ML; MG/100ML; MG/100ML
30 INJECTION, SOLUTION INTRAVENOUS CONTINUOUS PRN
Status: CANCELLED | OUTPATIENT
Start: 2021-10-13

## 2021-10-13 NOTE — PATIENT INSTRUCTIONS
Schedule EGD and colonoscopy for further evaluation.    Schedule hydrogen breath test to assess for small intestinal bacterial overgrowth.    Recommend starting a daily probiotic.  Recommend Align or [employer] available over-the-counter.  Take 1 capsule daily with food.

## 2022-02-21 RX ORDER — BISOPROLOL FUMARATE 5 MG/1
TABLET, FILM COATED ORAL
Qty: 90 TABLET | Refills: 1 | OUTPATIENT
Start: 2022-02-21

## 2022-02-23 RX ORDER — BISOPROLOL FUMARATE 5 MG/1
5 TABLET, FILM COATED ORAL DAILY
Qty: 90 TABLET | Refills: 1 | Status: SHIPPED | OUTPATIENT
Start: 2022-02-23 | End: 2022-02-23 | Stop reason: SDUPTHER

## 2022-02-23 RX ORDER — BISOPROLOL FUMARATE 5 MG/1
5 TABLET, FILM COATED ORAL DAILY
Qty: 90 TABLET | Refills: 1 | Status: SHIPPED | OUTPATIENT
Start: 2022-02-23 | End: 2022-03-08 | Stop reason: SDUPTHER

## 2022-03-08 ENCOUNTER — OFFICE VISIT (OUTPATIENT)
Dept: CARDIOLOGY | Facility: CLINIC | Age: 47
End: 2022-03-08

## 2022-03-08 VITALS
HEIGHT: 66 IN | SYSTOLIC BLOOD PRESSURE: 143 MMHG | WEIGHT: 231 LBS | BODY MASS INDEX: 37.12 KG/M2 | HEART RATE: 85 BPM | DIASTOLIC BLOOD PRESSURE: 78 MMHG

## 2022-03-08 DIAGNOSIS — R94.31 ABNORMAL EKG: Primary | ICD-10-CM

## 2022-03-08 DIAGNOSIS — I10 ESSENTIAL HYPERTENSION: ICD-10-CM

## 2022-03-08 DIAGNOSIS — E78.5 HYPERLIPIDEMIA, UNSPECIFIED HYPERLIPIDEMIA TYPE: ICD-10-CM

## 2022-03-08 DIAGNOSIS — E66.01 CLASS 2 SEVERE OBESITY WITH SERIOUS COMORBIDITY AND BODY MASS INDEX (BMI) OF 37.0 TO 37.9 IN ADULT, UNSPECIFIED OBESITY TYPE: ICD-10-CM

## 2022-03-08 PROCEDURE — 99214 OFFICE O/P EST MOD 30 MIN: CPT

## 2022-03-08 PROCEDURE — 93000 ELECTROCARDIOGRAM COMPLETE: CPT

## 2022-03-08 RX ORDER — AMITRIPTYLINE HYDROCHLORIDE 25 MG/1
25 TABLET, FILM COATED ORAL NIGHTLY
COMMUNITY

## 2022-03-08 RX ORDER — BISOPROLOL FUMARATE 5 MG/1
5 TABLET, FILM COATED ORAL DAILY
Qty: 90 TABLET | Refills: 3 | Status: SHIPPED | OUTPATIENT
Start: 2022-03-08 | End: 2023-03-10

## 2022-03-08 NOTE — PROGRESS NOTES
Subjective:        Barbara Pacheco is a 46 y.o. female who here for follow up    Chief Complaint   Patient presents with   • Follow-up     1 yr        HPI    This is a 46-year-old female who is new to this provider.  She has CKD, diabetes type 1, hypertension, hyperlipidemia. Stress test 6/1/2020 myocardial perfusion imaging indicated a normal study with no evidence of ischemia.  Echo 6/1/2020 EF 64%, normal LV function.    The following portions of the patient's history were reviewed and updated as appropriate: allergies, current medications, past family history, past medical history, past social history, past surgical history and problem list.    Past Medical History:   Diagnosis Date   • Abdominal pain    • Abnormal stress echo    • Allergic rhinitis    • Arthritis    • Chest pain    • Chronic kidney disease    • Diabetes mellitus type 1 (HCC)    • Esophagitis    • False positive cardiac stress test    • Family history of colon cancer    • GERD (gastroesophageal reflux disease)    • High blood pressure    • HLD (hyperlipidemia)    • Liver lesion          reports that she has never smoked. She has never used smokeless tobacco. She reports current alcohol use. She reports that she does not use drugs.     Family History   Problem Relation Age of Onset   • Diabetes Mother    • Diabetes Father    • Heart attack Father    • Hypertension Father    • Diabetes Sister    • Ulcerative colitis Sister    • Hyperlipidemia Paternal Grandfather    • Hypertension Paternal Grandfather    • Colon polyps Neg Hx    • Crohn's disease Neg Hx    • Colon cancer Neg Hx    • Irritable bowel syndrome Neg Hx        ROS     Review of Systems  Constitutional: No wt loss, fever, fatigue  Gastrointestinal: No nausea, abdominal pain  Behavioral/Psych: No insomnia or anxiety  Cardiovascular: no chest pain or shortness of breath      Objective:           Vitals and nursing note reviewed.   Constitutional:       Appearance: Well-developed.   HENT:       Head: Normocephalic.      Right Ear: External ear normal.      Left Ear: External ear normal.   Neck:      Vascular: No JVD.   Pulmonary:      Effort: Pulmonary effort is normal. No respiratory distress.      Breath sounds: Normal breath sounds. No stridor. No rales.   Cardiovascular:      Normal rate. Regular rhythm.      No gallop.   Pulses:     Intact distal pulses.   Abdominal:      General: Bowel sounds are normal. There is no distension.      Palpations: Abdomen is soft.      Tenderness: There is no abdominal tenderness. There is no guarding.   Musculoskeletal: Normal range of motion.         General: No tenderness.      Cervical back: Normal range of motion. Skin:     General: Skin is warm.   Neurological:      Mental Status: Alert and oriented to person, place, and time.      Deep Tendon Reflexes: Reflexes are normal and symmetric.   Psychiatric:         Judgment: Judgment normal.           ECG 12 Lead    Date/Time: 3/8/2022 11:06 AM  Performed by: Shari Hale APRN  Authorized by: Shari Hale APRN   Comparison: compared with previous ECG from 3/25/2020  Comparison to previous ECG: Inverted P waves compared to previous  Rhythm comments: junctional, inverted p waves  Rate: normal  BPM: 82  T flattening: all    Clinical impression: abnormal EKG          Interpretation Summary    · Calculated EF = 64%  · There is no evidence of pericardial effusion.    Interpretation Summary       · Moderate risk for ischemic heart disease.  · Findings consistent with a normal ECG stress test.  · Left ventricular ejection fraction is normal (Calculated EF = 60%).  · Myocardial perfusion imaging indicates a normal myocardial perfusion study with no evidence of ischemia.  · Impressions are consistent with a low risk study.           Current Outpatient Medications:   •  amitriptyline (ELAVIL) 25 MG tablet, Take 25 mg by mouth Every Night., Disp: , Rfl:   •  Biotin 5 MG capsule, Take  by mouth., Disp: , Rfl:   •   bisoprolol (ZEBeta) 5 MG tablet, Take 1 tablet by mouth Daily., Disp: 90 tablet, Rfl: 1  •  Cetirizine HCl 10 MG capsule, Take 1 tablet by mouth Daily., Disp: , Rfl:   •  chlorhexidine (PERIDEX) 0.12 % solution, USE TWICE DAILY ON A COTTON BALL AS DIRECTED, Disp: , Rfl:   •  Continuous Blood Gluc Sensor (DEXCOM G6 SENSOR), USE AS DIRECTED AND CHANGE EVERY 10 DAYS, Disp: , Rfl:   •  Continuous Blood Gluc Transmit (DEXCOM G6 TRANSMITTER) misc, See Admin Instructions., Disp: , Rfl:   •  desonide (DESOWEN) 0.05 % cream, , Disp: , Rfl:   •  etonogestrel-ethinyl estradiol (NUVARING) 0.12-0.015 MG/24HR vaginal ring, , Disp: , Rfl:   •  ferrous sulfate 325 (65 FE) MG EC tablet, Take 325 mg by mouth Daily., Disp: , Rfl:   •  glucose blood (Accu-Chek Guide) test strip, CHECK BG QID as instructed, Disp: , Rfl:   •  insulin aspart (novoLOG FLEXPEN) 100 UNIT/ML solution pen-injector sc pen, , Disp: , Rfl:   •  insulin glargine (LANTUS, SEMGLEE) 100 UNIT/ML injection, Inject 60 Units under the skin into the appropriate area as directed., Disp: , Rfl:   •  Insulin Infusion Pump device, Inject 2.7 Units/hr under the skin., Disp: , Rfl:   •  Insulin Lispro (HumaLOG) 100 UNIT/ML solution cartridge, PER INSULIN PUMP MAX 150U QD, Disp: , Rfl:   •  nystatin-triamcinolone (MYCOLOG) 170594-1.1 UNIT/GM-% ointment, APPLY TWICE DAILY X 4 5DAYS, THEN ONCE DAILY X4 5 DAYS, THEN EVERY OTHER DAY X 4 5D, Disp: , Rfl:   •  pantoprazole (PROTONIX) 40 MG EC tablet, , Disp: , Rfl:   •  prenatal vitamins (PRENATAL 27-1) 27-1 MG tablet tablet, Take  by mouth., Disp: , Rfl:   •  valACYclovir (VALTREX) 1000 MG tablet, , Disp: , Rfl:   •  valsartan-hydrochlorothiazide (DIOVAN-HCT) 160-12.5 MG per tablet, , Disp: , Rfl: 2  •  vitamin D (ERGOCALCIFEROL) 1.25 MG (17754 UT) capsule capsule, Take 50,000 Units by mouth., Disp: , Rfl:      Assessment:        Patient Active Problem List   Diagnosis   • Echocardiogram abnormal   • Type 1 diabetes mellitus (HCC)    • Diabetic retinopathy associated with type 1 diabetes mellitus (HCC)   • Gastroesophageal reflux disease   • Hyperlipidemia   • Chest pain on breathing   • Essential hypertension   • Palpitations   • Chronic kidney disease due to type 1 diabetes mellitus (HCC)   • Diabetic neuropathy associated with type 1 diabetes mellitus (HCC)   • Diabetic neuropathy (HCC)   • Family history of malignant neoplasm of digestive organs   • Family history of malignant neoplasm of digestive organs   • Hiatal hernia   • Lesion of liver   • Positive CHELSEY (antinuclear antibody)   • Presence of insulin pump   • Psoriatic arthritis (HCC)   • Diabetic retinopathy associated with type 1 diabetes mellitus (HCC)   • Hypertensive disorder   • Arthritis   • Esophagitis determined by biopsy               Plan:   1.  Hypertension: controlled    Importance of controlling hypertension and blood pressure  checkup on the regular basis has been explained. Hypertension as a silent killer has been discussed. Risk reduction of the weight and regular exercises to control the hypertension has been explained.    2.  Hyperlipidemia: does not take statin, her cholesterol and labs are managed by Dr Mohamud    Risk of the hyperlipidemia, importance of the treatment has been explained. Pros and cons of the statins has been explained. Regular blood workup as well as side effects including the liver failure, myelopathy death has been explained.    3.  Obesity: Body mass index is 37.28 kg/m².     Significant risk of obesity to CAD, HTN has been explained  Advantages of wt reduction has been explained    4. Abnormal ECG: ECG shows junctional rhythm-inverted P waves-changed from previous. She denies any chest pain or shortness of breath, however given her risk factors I will repeat stress and echo    It was explained to the patient that stress testing carries 85% specificity/sensitivity, and does not rule out future cardiac event.  Risks of the procedure were explained  to the patient including shortness of breath, induction of myocardial infarction, and dizziness.  Patient is agreeable to proceeding with stress testing.                No diagnosis found.    There are no diagnoses linked to this encounter.    COUNSELING: Jairo Antonio was given to patient for the following topics: diagnostic results, risk factor reductions, impressions, risks and benefits of treatment options and importance of treatment compliance .       SMOKING COUNSELING: Denies    cardiolite stress and echo, follow up for results    Sincerely,   MARCELA Diggs  [facility]  03/08/22  10:36 EST    EMR Dragon/Transcription disclaimer:   Much of this encounter note is an electronic transcription/translation of spoken language to printed text. The electronic translation of spoken language may permit erroneous, or at times, nonsensical words or phrases to be inadvertently transcribed; Although I have reviewed the note for such errors, some may still exist.

## 2022-03-29 ENCOUNTER — HOSPITAL ENCOUNTER (OUTPATIENT)
Dept: CARDIOLOGY | Facility: HOSPITAL | Age: 47
Discharge: HOME | End: 2022-03-29

## 2022-03-29 VITALS
DIASTOLIC BLOOD PRESSURE: 74 MMHG | SYSTOLIC BLOOD PRESSURE: 126 MMHG | OXYGEN SATURATION: 99 % | HEART RATE: 81 BPM | RESPIRATION RATE: 18 BRPM

## 2022-03-29 VITALS
BODY MASS INDEX: 37.12 KG/M2 | SYSTOLIC BLOOD PRESSURE: 120 MMHG | DIASTOLIC BLOOD PRESSURE: 77 MMHG | HEART RATE: 80 BPM | WEIGHT: 231 LBS | HEIGHT: 66 IN

## 2022-03-29 PROCEDURE — 93018 CV STRESS TEST I&R ONLY: CPT | Performed by: INTERNAL MEDICINE

## 2022-03-29 PROCEDURE — 78452 HT MUSCLE IMAGE SPECT MULT: CPT

## 2022-03-29 PROCEDURE — 25010000002 REGADENOSON 0.4 MG/5ML SOLUTION: Performed by: INTERNAL MEDICINE

## 2022-03-29 PROCEDURE — 93017 CV STRESS TEST TRACING ONLY: CPT

## 2022-03-29 PROCEDURE — 93306 TTE W/DOPPLER COMPLETE: CPT | Performed by: INTERNAL MEDICINE

## 2022-03-29 PROCEDURE — 93306 TTE W/DOPPLER COMPLETE: CPT

## 2022-03-29 PROCEDURE — 0 TECHNETIUM SESTAMIBI: Performed by: INTERNAL MEDICINE

## 2022-03-29 PROCEDURE — 78452 HT MUSCLE IMAGE SPECT MULT: CPT | Performed by: INTERNAL MEDICINE

## 2022-03-29 PROCEDURE — A9500 TC99M SESTAMIBI: HCPCS | Performed by: INTERNAL MEDICINE

## 2022-03-29 RX ADMIN — TECHNETIUM TC 99M SESTAMIBI 1 DOSE: 1 INJECTION INTRAVENOUS at 10:57

## 2022-03-29 RX ADMIN — REGADENOSON 0.4 MG: 0.08 INJECTION, SOLUTION INTRAVENOUS at 10:57

## 2022-03-29 RX ADMIN — TECHNETIUM TC 99M SESTAMIBI 1 DOSE: 1 INJECTION INTRAVENOUS at 08:06

## 2022-03-30 LAB
AORTIC ARCH: 2.4 CM
AORTIC DIMENSIONLESS INDEX: 0.7 (DI)
BH CV ECHO MEAS - ACS: 2.04 CM
BH CV ECHO MEAS - AO MAX PG: 9.2 MMHG
BH CV ECHO MEAS - AO MEAN PG: 4.6 MMHG
BH CV ECHO MEAS - AO ROOT DIAM: 2.8 CM
BH CV ECHO MEAS - AO V2 MAX: 151.3 CM/SEC
BH CV ECHO MEAS - AO V2 VTI: 28.3 CM
BH CV ECHO MEAS - AVA(I,D): 2.09 CM2
BH CV ECHO MEAS - EDV(CUBED): 58 ML
BH CV ECHO MEAS - EDV(MOD-SP2): 58 ML
BH CV ECHO MEAS - EDV(MOD-SP4): 61 ML
BH CV ECHO MEAS - EF(MOD-BP): 65.3 %
BH CV ECHO MEAS - EF(MOD-SP2): 58.6 %
BH CV ECHO MEAS - EF(MOD-SP4): 70.5 %
BH CV ECHO MEAS - ESV(CUBED): 16.4 ML
BH CV ECHO MEAS - ESV(MOD-SP2): 24 ML
BH CV ECHO MEAS - ESV(MOD-SP4): 18 ML
BH CV ECHO MEAS - FS: 34.3 %
BH CV ECHO MEAS - IVS/LVPW: 1.08 CM
BH CV ECHO MEAS - IVSD: 0.97 CM
BH CV ECHO MEAS - LAT PEAK E' VEL: 12.6 CM/SEC
BH CV ECHO MEAS - LV DIASTOLIC VOL/BSA (35-75): 28.7 CM2
BH CV ECHO MEAS - LV MASS(C)D: 109.1 GRAMS
BH CV ECHO MEAS - LV MAX PG: 4.3 MMHG
BH CV ECHO MEAS - LV MEAN PG: 2.08 MMHG
BH CV ECHO MEAS - LV SYSTOLIC VOL/BSA (12-30): 8.5 CM2
BH CV ECHO MEAS - LV V1 MAX: 103.3 CM/SEC
BH CV ECHO MEAS - LV V1 VTI: 19.7 CM
BH CV ECHO MEAS - LVIDD: 3.9 CM
BH CV ECHO MEAS - LVIDS: 2.5 CM
BH CV ECHO MEAS - LVOT AREA: 3 CM2
BH CV ECHO MEAS - LVOT DIAM: 1.95 CM
BH CV ECHO MEAS - LVPWD: 0.9 CM
BH CV ECHO MEAS - MED PEAK E' VEL: 8.5 CM/SEC
BH CV ECHO MEAS - MV A DUR: 0.13 SEC
BH CV ECHO MEAS - MV A MAX VEL: 104.8 CM/SEC
BH CV ECHO MEAS - MV DEC SLOPE: 422.8 CM/SEC2
BH CV ECHO MEAS - MV DEC TIME: 174 MSEC
BH CV ECHO MEAS - MV E MAX VEL: 108.7 CM/SEC
BH CV ECHO MEAS - MV E/A: 1.04
BH CV ECHO MEAS - MV MAX PG: 5.1 MMHG
BH CV ECHO MEAS - MV MEAN PG: 2.33 MMHG
BH CV ECHO MEAS - MV V2 VTI: 33.7 CM
BH CV ECHO MEAS - MVA(VTI): 1.75 CM2
BH CV ECHO MEAS - PA ACC TIME: 0.13 SEC
BH CV ECHO MEAS - PA PR(ACCEL): 22 MMHG
BH CV ECHO MEAS - PA V2 MAX: 91.1 CM/SEC
BH CV ECHO MEAS - PULM A REVS DUR: 0.11 SEC
BH CV ECHO MEAS - PULM A REVS VEL: 37.1 CM/SEC
BH CV ECHO MEAS - PULM DIAS VEL: 47 CM/SEC
BH CV ECHO MEAS - PULM SYS VEL: 69.7 CM/SEC
BH CV ECHO MEAS - RAP SYSTOLE: 3 MMHG
BH CV ECHO MEAS - RV MAX PG: 2.7 MMHG
BH CV ECHO MEAS - RV V1 MAX: 81.5 CM/SEC
BH CV ECHO MEAS - RV V1 VTI: 17.8 CM
BH CV ECHO MEAS - RVOT DIAM: 1.84 CM
BH CV ECHO MEAS - RVSP: 18 MMHG
BH CV ECHO MEAS - SI(MOD-SP2): 16 ML/M2
BH CV ECHO MEAS - SI(MOD-SP4): 20.2 ML/M2
BH CV ECHO MEAS - SV(LVOT): 59.1 ML
BH CV ECHO MEAS - SV(MOD-SP2): 34 ML
BH CV ECHO MEAS - SV(MOD-SP4): 43 ML
BH CV ECHO MEAS - SV(RVOT): 47.4 ML
BH CV ECHO MEAS - TAPSE (>1.6): 2.19 CM
BH CV ECHO MEAS - TR MAX PG: 15.1 MMHG
BH CV ECHO MEAS - TR MAX VEL: 194.1 CM/SEC
BH CV ECHO MEASUREMENTS AVERAGE E/E' RATIO: 10.3
BH CV XLRA - RV BASE: 2.3 CM
BH CV XLRA - RV LENGTH: 6 CM
BH CV XLRA - RV MID: 2.03 CM
BH CV XLRA - TDI S': 15.2 CM/SEC
LEFT ATRIUM VOLUME INDEX: 12.3 ML/M2
MAXIMAL PREDICTED HEART RATE: 174 BPM
SINUS: 2.3 CM
STRESS TARGET HR: 148 BPM

## 2022-03-31 LAB
BH CV REST NUCLEAR ISOTOPE DOSE: 10.9 MCI
BH CV STRESS BP STAGE 1: NORMAL
BH CV STRESS COMMENTS STAGE 1: NORMAL
BH CV STRESS DOSE REGADENOSON STAGE 1: 0.4
BH CV STRESS DURATION MIN STAGE 1: 2
BH CV STRESS DURATION SEC STAGE 1: 13
BH CV STRESS GRADE STAGE 1: 0
BH CV STRESS HR STAGE 1: 126
BH CV STRESS METS STAGE 1: 2
BH CV STRESS NUCLEAR ISOTOPE DOSE: 31.1 MCI
BH CV STRESS PROTOCOL 1: NORMAL
BH CV STRESS RECOVERY BP: NORMAL MMHG
BH CV STRESS RECOVERY HR: 97 BPM
BH CV STRESS RECOVERY O2: 99 %
BH CV STRESS SPEED STAGE 1: 1.4
BH CV STRESS STAGE 1: 1
LV EF NUC BP: 50 %
MAXIMAL PREDICTED HEART RATE: 174 BPM
PERCENT MAX PREDICTED HR: 72.41 %
STRESS BASELINE BP: NORMAL MMHG
STRESS BASELINE HR: 82 BPM
STRESS O2 SAT REST: 99 %
STRESS PERCENT HR: 85 %
STRESS POST ESTIMATED WORKLOAD: 2 METS
STRESS POST EXERCISE DUR MIN: 2 MIN
STRESS POST EXERCISE DUR SEC: 13 SEC
STRESS POST PEAK BP: NORMAL MMHG
STRESS POST PEAK HR: 126 BPM
STRESS TARGET HR: 148 BPM

## 2022-04-04 ENCOUNTER — OFFICE VISIT (OUTPATIENT)
Dept: CARDIOLOGY | Facility: CLINIC | Age: 47
End: 2022-04-04

## 2022-04-04 VITALS
HEART RATE: 86 BPM | HEIGHT: 66 IN | WEIGHT: 229 LBS | BODY MASS INDEX: 36.8 KG/M2 | SYSTOLIC BLOOD PRESSURE: 134 MMHG | DIASTOLIC BLOOD PRESSURE: 79 MMHG

## 2022-04-04 DIAGNOSIS — E78.00 PURE HYPERCHOLESTEROLEMIA: ICD-10-CM

## 2022-04-04 DIAGNOSIS — I10 ESSENTIAL HYPERTENSION: ICD-10-CM

## 2022-04-04 DIAGNOSIS — R00.2 PALPITATIONS: Primary | ICD-10-CM

## 2022-04-04 PROCEDURE — 99213 OFFICE O/P EST LOW 20 MIN: CPT | Performed by: INTERNAL MEDICINE

## 2022-04-04 NOTE — PROGRESS NOTES
TEST RESULTS   Subjective:        Barbara Pacheco is a 46 y.o. female who here for follow up    CC  ABNORMAL EKG  HPI  46 years old female with benign essential arterial hypertension hyperlipidemia and palpitations here for the follow-up with no complaints of chest pains tightness heaviness or the pressure sensation     Problems Addressed this Visit        Cardiac and Vasculature    Hyperlipidemia    Essential hypertension    Palpitations - Primary      Diagnoses       Codes Comments    Palpitations    -  Primary ICD-10-CM: R00.2  ICD-9-CM: 785.1     Essential hypertension     ICD-10-CM: I10  ICD-9-CM: 401.9     Pure hypercholesterolemia     ICD-10-CM: E78.00  ICD-9-CM: 272.0         .Interpretation Summary       · Findings consistent with a normal ECG stress test.  · Left ventricular ejection fraction is normal. (Calculated EF = 50%).  · Myocardial perfusion imaging indicates a normal myocardial perfusion study with no evidence of ischemia.  · Impressions are consistent with a low risk study.  · Small Lateral wall defect seen on polar view appears to be due to artifact    Interpretation Summary    · Estimated right ventricular systolic pressure from tricuspid regurgitation is normal (<35 mmHg).  · Calculated left ventricular EF = 65.3% Estimated left ventricular EF was in agreement with the calculated left ventricular EF.  · Left ventricular diastolic function was normal.  · There is no evidence of pericardial effusion. .         The following portions of the patient's history were reviewed and updated as appropriate: allergies, current medications, past family history, past medical history, past social history, past surgical history and problem list.    Past Medical History:   Diagnosis Date   • Abdominal pain    • Abnormal stress echo    • Allergic rhinitis    • Arthritis    • Chest pain    • Chronic kidney disease    • Diabetes mellitus type 1 (HCC)    • Esophagitis    • False positive cardiac stress test    •  "Family history of colon cancer    • GERD (gastroesophageal reflux disease)    • High blood pressure    • HLD (hyperlipidemia)    • Liver lesion    • Neuropathy 03/29/2022    both feet     reports that she has never smoked. She has never used smokeless tobacco. She reports current alcohol use. She reports that she does not use drugs.   Family History   Problem Relation Age of Onset   • Diabetes Mother    • Diabetes Father    • Heart attack Father    • Hypertension Father    • Diabetes Sister    • Ulcerative colitis Sister    • Hyperlipidemia Paternal Grandfather    • Hypertension Paternal Grandfather    • Colon polyps Neg Hx    • Crohn's disease Neg Hx    • Colon cancer Neg Hx    • Irritable bowel syndrome Neg Hx        Review of Systems  Constitutional: No wt loss, fever, fatigue  Gastrointestinal: No nausea, abdominal pain  Behavioral/Psych: No insomnia or anxiety   Cardiovascular no chest pains or tightness in the chest  Objective:       Physical Exam  /79   Pulse 86   Ht 167.6 cm (66\")   Wt 104 kg (229 lb)   BMI 36.96 kg/m²   General appearance: No acute changes   Neck: Trachea midline; NECK, supple, no thyromegaly or lymphadenopathy   Lungs: Normal size and shape, normal breath sounds, equal distribution of air, no rales and rhonchi   CV: S1-S2 regular, no murmurs, no rub, no gallop   Abdomen: Soft, nontender; no masses , no abnormal abdominal sounds   Extremities: No deformity , normal color , no peripheral edema   Skin: Normal temperature, turgor and texture; no rash, ulcers          Procedures      Echocardiogram:        Current Outpatient Medications:   •  amitriptyline (ELAVIL) 25 MG tablet, Take 25 mg by mouth Every Night., Disp: , Rfl:   •  Biotin 5 MG capsule, Take  by mouth., Disp: , Rfl:   •  bisoprolol (ZEBeta) 5 MG tablet, Take 1 tablet by mouth Daily., Disp: 90 tablet, Rfl: 3  •  Cetirizine HCl 10 MG capsule, Take 1 tablet by mouth Daily., Disp: , Rfl:   •  chlorhexidine (PERIDEX) 0.12 % " solution, USE TWICE DAILY ON A COTTON BALL AS DIRECTED, Disp: , Rfl:   •  Continuous Blood Gluc Sensor (DEXCOM G6 SENSOR), USE AS DIRECTED AND CHANGE EVERY 10 DAYS, Disp: , Rfl:   •  Continuous Blood Gluc Transmit (DEXCOM G6 TRANSMITTER) misc, See Admin Instructions., Disp: , Rfl:   •  desonide (DESOWEN) 0.05 % cream, , Disp: , Rfl:   •  etonogestrel-ethinyl estradiol (NUVARING) 0.12-0.015 MG/24HR vaginal ring, , Disp: , Rfl:   •  ferrous sulfate 325 (65 FE) MG EC tablet, Take 325 mg by mouth Daily., Disp: , Rfl:   •  glucose blood (Accu-Chek Guide) test strip, CHECK BG QID as instructed, Disp: , Rfl:   •  insulin aspart (novoLOG FLEXPEN) 100 UNIT/ML solution pen-injector sc pen, , Disp: , Rfl:   •  insulin glargine (LANTUS, SEMGLEE) 100 UNIT/ML injection, Inject 60 Units under the skin into the appropriate area as directed., Disp: , Rfl:   •  Insulin Infusion Pump device, Inject 2.7 Units/hr under the skin., Disp: , Rfl:   •  Insulin Lispro (HumaLOG) 100 UNIT/ML solution cartridge, PER INSULIN PUMP MAX 150U QD, Disp: , Rfl:   •  nystatin-triamcinolone (MYCOLOG) 221122-8.1 UNIT/GM-% ointment, APPLY TWICE DAILY X 4 5DAYS, THEN ONCE DAILY X4 5 DAYS, THEN EVERY OTHER DAY X 4 5D, Disp: , Rfl:   •  pantoprazole (PROTONIX) 40 MG EC tablet, , Disp: , Rfl:   •  prenatal vitamins (PRENATAL 27-1) 27-1 MG tablet tablet, Take  by mouth., Disp: , Rfl:   •  valACYclovir (VALTREX) 1000 MG tablet, , Disp: , Rfl:   •  valsartan-hydrochlorothiazide (DIOVAN-HCT) 160-12.5 MG per tablet, , Disp: , Rfl: 2  •  vitamin D (ERGOCALCIFEROL) 1.25 MG (14065 UT) capsule capsule, Take 50,000 Units by mouth., Disp: , Rfl:    Assessment:        Patient Active Problem List   Diagnosis   • Echocardiogram abnormal   • Type 1 diabetes mellitus (HCC)   • Diabetic retinopathy associated with type 1 diabetes mellitus (HCC)   • Gastroesophageal reflux disease   • Hyperlipidemia   • Chest pain on breathing   • Essential hypertension   • Palpitations   •  Chronic kidney disease due to type 1 diabetes mellitus (HCC)   • Diabetic neuropathy associated with type 1 diabetes mellitus (HCC)   • Diabetic neuropathy (HCC)   • Family history of malignant neoplasm of digestive organs   • Family history of malignant neoplasm of digestive organs   • Hiatal hernia   • Lesion of liver   • Positive CHELSEY (antinuclear antibody)   • Presence of insulin pump   • Psoriatic arthritis (HCC)   • Diabetic retinopathy associated with type 1 diabetes mellitus (HCC)   • Hypertensive disorder   • Arthritis   • Esophagitis determined by biopsy               Plan:            ICD-10-CM ICD-9-CM   1. Palpitations  R00.2 785.1   2. Essential hypertension  I10 401.9   3. Pure hypercholesterolemia  E78.00 272.0     1. Palpitations  Potation's under control  2. Essential hypertension  Pressure under control    3. Pure hypercholesterolemia  Continue current treatment     Specificity and sensitivity of the stress test/ cardiac workup has been explained. Pt has been explained if  Symptoms continue please go to ER, and further w/p will be required.    Also explained this does not rule out coronary artery disease or the future events, continue to emphasize on risk reductions for coronary artery disease    Pt also advised to contact PCP for other causes of symptoms    COUNSELING:    Barbara Antonio was given to patient for the following topics: diagnostic results, risk factor reductions, impressions, risks and benefits of treatment options and importance of treatment compliance .       SMOKING COUNSELING:    [unfilled]    Dictated using Dragon dictation

## 2022-04-08 PROBLEM — E61.1 IRON DEFICIENCY: Status: ACTIVE | Noted: 2022-04-08

## 2022-04-08 PROBLEM — Z12.11 ENCOUNTER FOR SCREENING FOR MALIGNANT NEOPLASM OF COLON: Status: ACTIVE | Noted: 2022-04-08

## 2022-04-08 PROBLEM — R14.0 BLOATING: Status: ACTIVE | Noted: 2022-04-08

## 2022-06-30 ENCOUNTER — TELEPHONE (OUTPATIENT)
Dept: GASTROENTEROLOGY | Facility: CLINIC | Age: 47
End: 2022-06-30

## 2022-06-30 NOTE — TELEPHONE ENCOUNTER
Pt left message  to reschedule procedure on 7/15/22, I tried to marv back and had to leave a message

## 2022-07-13 ENCOUNTER — APPOINTMENT (OUTPATIENT)
Dept: LAB | Facility: SURGERY CENTER | Age: 47
End: 2022-07-13

## 2022-09-20 ENCOUNTER — APPOINTMENT (OUTPATIENT)
Dept: WOMENS IMAGING | Facility: HOSPITAL | Age: 47
End: 2022-09-20

## 2022-09-20 PROCEDURE — 77063 BREAST TOMOSYNTHESIS BI: CPT | Performed by: RADIOLOGY

## 2022-09-20 PROCEDURE — 77067 SCR MAMMO BI INCL CAD: CPT | Performed by: RADIOLOGY

## 2022-09-26 NOTE — SIGNIFICANT NOTE
Education provided the Patient on the following:    - Nothing to Eat or Drink after MN the night before the procedure    - Avoid red/purple fluids while completing their bowel prep as ordered by physician  -Contact Gastrointerologist office for any questions about specific details regarding colon prep    -You will need to have someone drive you home after your colonoscopy and remain with you for 24 hours after the procedure  - The date of your procedure, your are welcome to have one visitor at bedside or remain within 10-15 minutes of [facility]  -Please wear warm socks when you arrive for your procedure  -Remove all jewelry and leave any valuables before arriving the day of your procedure (all will have to be removed before leaving preop)  -You will need to arrive at 0815 on 9/29 for your procedure    -Feel free to contact us at: [phone] with any additional questions/concerns     RN called Dr. Alejandro's office and requested cardiac clearance and most recent EKG to be faxed to us at [phone].

## 2022-09-29 ENCOUNTER — ANESTHESIA (OUTPATIENT)
Dept: SURGERY | Facility: SURGERY CENTER | Age: 47
End: 2022-09-29

## 2022-09-29 ENCOUNTER — HOSPITAL ENCOUNTER (OUTPATIENT)
Facility: SURGERY CENTER | Age: 47
Setting detail: HOSPITAL OUTPATIENT SURGERY
Discharge: HOME | End: 2022-09-29
Attending: INTERNAL MEDICINE | Admitting: INTERNAL MEDICINE

## 2022-09-29 ENCOUNTER — ANESTHESIA EVENT (OUTPATIENT)
Dept: SURGERY | Facility: SURGERY CENTER | Age: 47
End: 2022-09-29

## 2022-09-29 VITALS
HEIGHT: 66 IN | SYSTOLIC BLOOD PRESSURE: 124 MMHG | BODY MASS INDEX: 33.17 KG/M2 | WEIGHT: 206.4 LBS | DIASTOLIC BLOOD PRESSURE: 73 MMHG | HEART RATE: 77 BPM | OXYGEN SATURATION: 98 % | TEMPERATURE: 98 F | RESPIRATION RATE: 16 BRPM

## 2022-09-29 DIAGNOSIS — Z12.11 ENCOUNTER FOR SCREENING FOR MALIGNANT NEOPLASM OF COLON: ICD-10-CM

## 2022-09-29 DIAGNOSIS — E61.1 IRON DEFICIENCY: ICD-10-CM

## 2022-09-29 DIAGNOSIS — K21.00 GASTROESOPHAGEAL REFLUX DISEASE WITH ESOPHAGITIS WITHOUT HEMORRHAGE: ICD-10-CM

## 2022-09-29 DIAGNOSIS — R14.0 BLOATING: ICD-10-CM

## 2022-09-29 DIAGNOSIS — K20.90 ESOPHAGITIS: ICD-10-CM

## 2022-09-29 DIAGNOSIS — Z80.0 FAMILY HISTORY OF COLON CANCER: ICD-10-CM

## 2022-09-29 PROCEDURE — 25010000002 PROPOFOL 10 MG/ML EMULSION: Performed by: ANESTHESIOLOGY

## 2022-09-29 PROCEDURE — 43239 EGD BIOPSY SINGLE/MULTIPLE: CPT | Performed by: INTERNAL MEDICINE

## 2022-09-29 PROCEDURE — 88305 TISSUE EXAM BY PATHOLOGIST: CPT | Performed by: INTERNAL MEDICINE

## 2022-09-29 PROCEDURE — 45380 COLONOSCOPY AND BIOPSY: CPT | Performed by: INTERNAL MEDICINE

## 2022-09-29 RX ORDER — PROPOFOL 10 MG/ML
VIAL (ML) INTRAVENOUS AS NEEDED
Status: DISCONTINUED | OUTPATIENT
Start: 2022-09-29 | End: 2022-09-29 | Stop reason: SURG

## 2022-09-29 RX ORDER — SODIUM CHLORIDE, SODIUM LACTATE, POTASSIUM CHLORIDE, CALCIUM CHLORIDE 600; 310; 30; 20 MG/100ML; MG/100ML; MG/100ML; MG/100ML
INJECTION, SOLUTION INTRAVENOUS CONTINUOUS PRN
Status: DISCONTINUED | OUTPATIENT
Start: 2022-09-29 | End: 2022-09-29 | Stop reason: SURG

## 2022-09-29 RX ORDER — MAGNESIUM HYDROXIDE 1200 MG/15ML
LIQUID ORAL AS NEEDED
Status: DISCONTINUED | OUTPATIENT
Start: 2022-09-29 | End: 2022-09-29 | Stop reason: HOSPADM

## 2022-09-29 RX ORDER — SODIUM CHLORIDE, SODIUM LACTATE, POTASSIUM CHLORIDE, CALCIUM CHLORIDE 600; 310; 30; 20 MG/100ML; MG/100ML; MG/100ML; MG/100ML
30 INJECTION, SOLUTION INTRAVENOUS CONTINUOUS PRN
Status: DISCONTINUED | OUTPATIENT
Start: 2022-09-29 | End: 2022-09-29 | Stop reason: HOSPADM

## 2022-09-29 RX ORDER — SODIUM CHLORIDE 0.9 % (FLUSH) 0.9 %
10 SYRINGE (ML) INJECTION AS NEEDED
Status: DISCONTINUED | OUTPATIENT
Start: 2022-09-29 | End: 2022-09-29 | Stop reason: HOSPADM

## 2022-09-29 RX ORDER — LIDOCAINE HYDROCHLORIDE 20 MG/ML
INJECTION, SOLUTION INFILTRATION; PERINEURAL AS NEEDED
Status: DISCONTINUED | OUTPATIENT
Start: 2022-09-29 | End: 2022-09-29 | Stop reason: SURG

## 2022-09-29 RX ORDER — SODIUM CHLORIDE 0.9 % (FLUSH) 0.9 %
3 SYRINGE (ML) INJECTION EVERY 12 HOURS SCHEDULED
Status: DISCONTINUED | OUTPATIENT
Start: 2022-09-29 | End: 2022-09-29 | Stop reason: HOSPADM

## 2022-09-29 RX ADMIN — LIDOCAINE HYDROCHLORIDE 40 MG: 20 INJECTION, SOLUTION INFILTRATION; PERINEURAL at 09:23

## 2022-09-29 RX ADMIN — SODIUM CHLORIDE, SODIUM LACTATE, POTASSIUM CHLORIDE, AND CALCIUM CHLORIDE: .6; .31; .03; .02 INJECTION, SOLUTION INTRAVENOUS at 09:22

## 2022-09-29 RX ADMIN — PROPOFOL INJECTABLE EMULSION 200 MCG/KG/MIN: 10 INJECTION, EMULSION INTRAVENOUS at 09:23

## 2022-09-29 RX ADMIN — PROPOFOL 100 MG: 10 INJECTION, EMULSION INTRAVENOUS at 09:23

## 2022-09-29 RX ADMIN — PROPOFOL 50 MG: 10 INJECTION, EMULSION INTRAVENOUS at 09:33

## 2022-09-29 NOTE — H&P
No chief complaint on file.      HPI  Bloating  fe deficiency  Fh crc  screening         Problem List:    Patient Active Problem List   Diagnosis   • Echocardiogram abnormal   • Type 1 diabetes mellitus (HCC)   • Diabetic retinopathy associated with type 1 diabetes mellitus (HCC)   • Gastroesophageal reflux disease   • Hyperlipidemia   • Chest pain on breathing   • Essential hypertension   • Palpitations   • Chronic kidney disease due to type 1 diabetes mellitus (HCC)   • Diabetic neuropathy associated with type 1 diabetes mellitus (HCC)   • Diabetic neuropathy (HCC)   • Family history of malignant neoplasm of digestive organs   • Family history of malignant neoplasm of digestive organs   • Hiatal hernia   • Lesion of liver   • Positive CHELSEY (antinuclear antibody)   • Presence of insulin pump   • Psoriatic arthritis (HCC)   • Diabetic retinopathy associated with type 1 diabetes mellitus (HCC)   • Hypertensive disorder   • Arthritis   • Esophagitis determined by biopsy   • Bloating   • Iron deficiency   • Encounter for screening for malignant neoplasm of colon       Medical History:    Past Medical History:   Diagnosis Date   • Abdominal pain    • Abnormal stress echo    • Allergic rhinitis    • Arthritis    • Chest pain    • Chronic kidney disease    • Diabetes mellitus type 1 (HCC)    • Esophagitis    • False positive cardiac stress test    • Family history of colon cancer    • GERD (gastroesophageal reflux disease)    • High blood pressure    • HLD (hyperlipidemia)    • Liver lesion    • Neuropathy 03/29/2022    both feet        Social History:    Social History     Socioeconomic History   • Marital status: Single   Tobacco Use   • Smoking status: Never Smoker   • Smokeless tobacco: Never Used   Vaping Use   • Vaping Use: Never used   Substance and Sexual Activity   • Alcohol use: Yes     Comment: occ wine    • Drug use: No   • Sexual activity: Defer     Birth control/protection: I.U.D.     Comment: Nuvu ring        Family History:   Family History   Problem Relation Age of Onset   • Diabetes Mother    • Diabetes Father    • Heart attack Father    • Hypertension Father    • Diabetes Sister    • Ulcerative colitis Sister    • Hyperlipidemia Paternal Grandfather    • Hypertension Paternal Grandfather    • Colon polyps Neg Hx    • Crohn's disease Neg Hx    • Colon cancer Neg Hx    • Irritable bowel syndrome Neg Hx        Surgical History:   Past Surgical History:   Procedure Laterality Date   • CARDIAC CATHETERIZATION     •  SECTION      1   • COLONOSCOPY      10/17/2017   • MOUTH SURGERY         No current facility-administered medications for this encounter.    Current Outpatient Medications:   •  amitriptyline (ELAVIL) 25 MG tablet, Take 25 mg by mouth Every Night., Disp: , Rfl:   •  Biotin 5 MG capsule, Take  by mouth., Disp: , Rfl:   •  bisoprolol (ZEBeta) 5 MG tablet, Take 1 tablet by mouth Daily., Disp: 90 tablet, Rfl: 3  •  Cetirizine HCl 10 MG capsule, Take 1 tablet by mouth Daily., Disp: , Rfl:   •  chlorhexidine (PERIDEX) 0.12 % solution, USE TWICE DAILY ON A COTTON BALL AS DIRECTED, Disp: , Rfl:   •  Continuous Blood Gluc Sensor (DEXCOM G6 SENSOR), USE AS DIRECTED AND CHANGE EVERY 10 DAYS, Disp: , Rfl:   •  Continuous Blood Gluc Transmit (DEXCOM G6 TRANSMITTER) misc, See Admin Instructions., Disp: , Rfl:   •  desonide (DESOWEN) 0.05 % cream, , Disp: , Rfl:   •  etonogestrel-ethinyl estradiol (NUVARING) 0.12-0.015 MG/24HR vaginal ring, , Disp: , Rfl:   •  insulin aspart (novoLOG FLEXPEN) 100 UNIT/ML solution pen-injector sc pen, , Disp: , Rfl:   •  insulin glargine (LANTUS, SEMGLEE) 100 UNIT/ML injection, Inject 60 Units under the skin into the appropriate area as directed., Disp: , Rfl:   •  Insulin Infusion Pump device, Inject 2.7 Units/hr under the skin., Disp: , Rfl:   •  Insulin Lispro (HumaLOG) 100 UNIT/ML solution cartridge, PER INSULIN PUMP MAX 150U QD, Disp: , Rfl:   •  nystatin-triamcinolone  (MYCOLOG) 664310-4.1 UNIT/GM-% ointment, APPLY TWICE DAILY X 4 5DAYS, THEN ONCE DAILY X4 5 DAYS, THEN EVERY OTHER DAY X 4 5D, Disp: , Rfl:   •  pantoprazole (PROTONIX) 40 MG EC tablet, , Disp: , Rfl:   •  prenatal vitamins (PRENATAL 27-1) 27-1 MG tablet tablet, Take  by mouth., Disp: , Rfl:   •  valACYclovir (VALTREX) 1000 MG tablet, As Needed., Disp: , Rfl:   •  valsartan-hydrochlorothiazide (DIOVAN-HCT) 160-12.5 MG per tablet, , Disp: , Rfl: 2  •  vitamin D (ERGOCALCIFEROL) 1.25 MG (05430 UT) capsule capsule, Take 50,000 Units by mouth., Disp: , Rfl:     Allergies: No Known Allergies     The following portions of the patient's history were reviewed by me and updated as appropriate: review of systems, allergies, current medications, past family history, past medical history, past social history, past surgical history and problem list.    There were no vitals filed for this visit.    PHYSICAL EXAM:    CONSTITUTIONAL:  today's vital signs reviewed by me  GASTROINTESTINAL: abdomen is soft nontender nondistended with normal active bowel sounds, no masses are appreciated    Assessment/ Plan  Bloating  fe deficiency  Fh crc  Screening    egd ad clonoscopy    Risks and benefits as well as alternatives to endoscopic evaluation were explained to the patient and they voiced understanding and wish to proceed.  These risks include but are not limited to the risk of bleeding, perforation, adverse reaction to sedation, and missed lesions.  The patient was given the opportunity to ask questions prior to the endoscopic procedure.

## 2022-09-29 NOTE — ANESTHESIA PREPROCEDURE EVALUATION
Anesthesia Evaluation     Patient summary reviewed   NPO Solid Status: > 6 hours  NPO Liquid Status: > 2 hours           Airway   Mallampati: II  TM distance: >3 FB  Neck ROM: full  Dental - normal exam     Pulmonary    (-) COPD, asthma, sleep apnea, not a smoker  Cardiovascular     (+) hypertension, hyperlipidemia,   (-) CAD, dysrhythmias, angina      Neuro/Psych  (-) seizures, CVA  GI/Hepatic/Renal/Endo    (+) obesity,  hiatal hernia, GERD well controlled,  renal disease CRI, diabetes mellitus type 1 using insulin,   (-) liver disease, no thyroid disorder    Musculoskeletal     Abdominal    Substance History      OB/GYN          Other                        Anesthesia Plan    ASA 3     MAC       Anesthetic plan, risks, benefits, and alternatives have been provided, discussed and informed consent has been obtained with: patient.        CODE STATUS:

## 2022-09-29 NOTE — ANESTHESIA POSTPROCEDURE EVALUATION
Patient: Barbara Pacheco    Procedure Summary     Date: 09/29/22 Room / Location: [facility]  / [facility]    Anesthesia Start: 0921 Anesthesia Stop: 0956    Procedures:       ESOPHAGOGASTRODUODENOSCOPY WITH BIOPSY (N/A )      COLONOSCOPY FOR SCREENING WITH POLYPECTOMY (N/A ) Diagnosis:       Bloating      Esophagitis      Iron deficiency      Gastroesophageal reflux disease with esophagitis without hemorrhage      Family history of colon cancer      Encounter for screening for malignant neoplasm of colon      (Bloating [R14.0])      (Esophagitis [K20.90])      (Iron deficiency [E61.1])      (Gastroesophageal reflux disease with esophagitis without hemorrhage [K21.00])      (Family history of colon cancer [Z80.0])      (Encounter for screening for malignant neoplasm of colon [Z12.11])    Surgeons: Yandel Merchant MD Provider: Mart Arnold MD    Anesthesia Type: MAC ASA Status: 3          Anesthesia Type: MAC    Vitals  Vitals Value Taken Time   /73 09/29/22 1017   Temp 36.7 °C (98 °F) 09/29/22 0956   Pulse 77 09/29/22 1017   Resp 16 09/29/22 1017   SpO2 98 % 09/29/22 1017           Post Anesthesia Care and Evaluation    Level of consciousness: awake  Pain management: satisfactory to patient    Airway patency: patent  Anesthetic complications: No anesthetic complications  PONV Status: controlled  Cardiovascular status: acceptable  Respiratory status: acceptable  Hydration status: acceptable

## 2022-09-30 ENCOUNTER — TELEPHONE (OUTPATIENT)
Dept: CARDIOLOGY | Facility: CLINIC | Age: 47
End: 2022-09-30

## 2022-09-30 LAB
LAB AP CASE REPORT: NORMAL
LAB AP CLINICAL INFORMATION: NORMAL
PATH REPORT.FINAL DX SPEC: NORMAL
PATH REPORT.GROSS SPEC: NORMAL

## 2022-09-30 NOTE — TELEPHONE ENCOUNTER
----- Message from Bailee De Leon sent at 9/26/2022  2:00 PM EDT -----  Regarding: cardiac clearance  Pt is having colonoscopy and EGD on Thursday Sept. 29 and Dr. Merchant is requesting clearance and her most recent EKG.    Please advise  [phone]  Fax: [phone]    Patient already had procedures without a clearance.

## 2023-03-10 RX ORDER — BISOPROLOL FUMARATE 5 MG/1
TABLET, FILM COATED ORAL
Qty: 90 TABLET | Refills: 0 | Status: SHIPPED | OUTPATIENT
Start: 2023-03-10 | End: 2023-04-07 | Stop reason: SDUPTHER

## 2023-04-07 ENCOUNTER — OFFICE VISIT (OUTPATIENT)
Dept: CARDIOLOGY | Facility: CLINIC | Age: 48
End: 2023-04-07
Payer: COMMERCIAL

## 2023-04-07 VITALS
HEART RATE: 78 BPM | BODY MASS INDEX: 30.53 KG/M2 | HEIGHT: 66 IN | SYSTOLIC BLOOD PRESSURE: 132 MMHG | DIASTOLIC BLOOD PRESSURE: 80 MMHG | WEIGHT: 190 LBS

## 2023-04-07 DIAGNOSIS — I10 ESSENTIAL HYPERTENSION: Primary | ICD-10-CM

## 2023-04-07 DIAGNOSIS — E66.9 OBESITY (BMI 30-39.9): ICD-10-CM

## 2023-04-07 DIAGNOSIS — R00.2 PALPITATIONS: ICD-10-CM

## 2023-04-07 DIAGNOSIS — E78.00 PURE HYPERCHOLESTEROLEMIA: ICD-10-CM

## 2023-04-07 RX ORDER — BISOPROLOL FUMARATE 5 MG/1
5 TABLET, FILM COATED ORAL DAILY
Qty: 90 TABLET | Refills: 3 | Status: SHIPPED | OUTPATIENT
Start: 2023-04-07

## 2023-04-07 NOTE — PROGRESS NOTES
Subjective:        Barbara Pacheco is a 47 y.o. female who here for follow up    Chief Complaint   Patient presents with   • Follow-up     1 yr        HPI    Barbara Pacheco is a 47-year-old female no with hypertension, hyperlipidemia, capitation's, DM type I, CKD.  She follows up in office today for management of palpitations and hypertension.    Echo 3/29/2022 EF 65%, normal LV function.  Stress test 3/29/2022 was a normal myocardial perfusion study with no evidence of ischemia.  Small lateral wall defect seen on polar view appears to be due to artifact.      The following portions of the patient's history were reviewed and updated as appropriate: allergies, current medications, past family history, past medical history, past social history, past surgical history and problem list.    Past Medical History:   Diagnosis Date   • Abdominal pain    • Abnormal stress echo    • Allergic rhinitis    • Arthritis    • Chest pain    • Chronic kidney disease    • Diabetes mellitus type 1    • Esophagitis    • False positive cardiac stress test    • Family history of colon cancer    • GERD (gastroesophageal reflux disease)    • High blood pressure    • HLD (hyperlipidemia)    • Liver lesion    • Neuropathy 03/29/2022    both feet         reports that she has never smoked. She has never used smokeless tobacco. She reports current alcohol use. She reports that she does not use drugs.     Family History   Problem Relation Age of Onset   • Diabetes Mother    • Diabetes Father    • Heart attack Father    • Hypertension Father    • Diabetes Sister    • Ulcerative colitis Sister    • Hyperlipidemia Paternal Grandfather    • Hypertension Paternal Grandfather    • Colon polyps Neg Hx    • Crohn's disease Neg Hx    • Colon cancer Neg Hx    • Irritable bowel syndrome Neg Hx        ROS     Review of Systems  Constitutional: no wt loss, fever, fatigue  Gastrointestinal: no nausea, abdominal pain  Behavioral/Psych: no insomnia or  anxiety  Cardiovascular: no chest pain or shortness of breath      Objective:           Vitals and nursing note reviewed.   Constitutional:       Appearance: Well-developed.   HENT:      Head: Normocephalic.      Right Ear: External ear normal.      Left Ear: External ear normal.   Neck:      Vascular: No JVD.   Pulmonary:      Effort: Pulmonary effort is normal. No respiratory distress.      Breath sounds: Normal breath sounds. No stridor. No rales.   Cardiovascular:      Normal rate. Regular rhythm.      No gallop.   Pulses:     Intact distal pulses.   Abdominal:      General: Bowel sounds are normal. There is no distension.      Palpations: Abdomen is soft.      Tenderness: There is no abdominal tenderness. There is no guarding.   Musculoskeletal: Normal range of motion.         General: No tenderness.      Cervical back: Normal range of motion. Skin:     General: Skin is warm.   Neurological:      Mental Status: Alert and oriented to person, place, and time.      Deep Tendon Reflexes: Reflexes are normal and symmetric.   Psychiatric:         Judgment: Judgment normal.           ECG 12 Lead    Date/Time: 4/7/2023 11:04 AM  Performed by: Shari Hale APRN  Authorized by: Shari Hale APRN   Comparison: compared with previous ECG from 3/8/2022  Similar to previous ECG  Rhythm: sinus rhythm  Rate: normal  BPM: 78  Other findings: non-specific ST-T wave changes    Clinical impression: non-specific ECG          Interpretation Summary    · Calculated EF = 64%  · There is no evidence of pericardial effusion.    Interpretation Summary       · Moderate risk for ischemic heart disease.  · Findings consistent with a normal ECG stress test.  · Left ventricular ejection fraction is normal (Calculated EF = 60%).  · Myocardial perfusion imaging indicates a normal myocardial perfusion study with no evidence of ischemia.  · Impressions are consistent with a low risk study.           Current Outpatient Medications:   •   amitriptyline (ELAVIL) 25 MG tablet, Take 1 tablet by mouth Every Night., Disp: , Rfl:   •  Biotin 5 MG capsule, Take  by mouth., Disp: , Rfl:   •  bisoprolol (ZEBeta) 5 MG tablet, TAKE 1 TABLET BY MOUTH EVERY DAY, Disp: 90 tablet, Rfl: 0  •  Cetirizine HCl 10 MG capsule, Take 1 tablet by mouth Daily., Disp: , Rfl:   •  Continuous Blood Gluc Sensor (DEXCOM G6 SENSOR), USE AS DIRECTED AND CHANGE EVERY 10 DAYS, Disp: , Rfl:   •  Continuous Blood Gluc Transmit (DEXCOM G6 TRANSMITTER) misc, See Admin Instructions., Disp: , Rfl:   •  desonide (DESOWEN) 0.05 % cream, , Disp: , Rfl:   •  etonogestrel-ethinyl estradiol (NUVARING) 0.12-0.015 MG/24HR vaginal ring, , Disp: , Rfl:   •  insulin aspart (novoLOG FLEXPEN) 100 UNIT/ML solution pen-injector sc pen, , Disp: , Rfl:   •  insulin glargine (LANTUS, SEMGLEE) 100 UNIT/ML injection, Inject 60 Units under the skin into the appropriate area as directed., Disp: , Rfl:   •  Insulin Infusion Pump device, Inject 2.7 Units/hr under the skin., Disp: , Rfl:   •  Insulin Lispro (HumaLOG) 100 UNIT/ML solution cartridge, PER INSULIN PUMP MAX 150U QD, Disp: , Rfl:   •  nystatin-triamcinolone (MYCOLOG) 687937-5.1 UNIT/GM-% ointment, APPLY TWICE DAILY X 4 5DAYS, THEN ONCE DAILY X4 5 DAYS, THEN EVERY OTHER DAY X 4 5D, Disp: , Rfl:   •  pantoprazole (PROTONIX) 40 MG EC tablet, , Disp: , Rfl:   •  prenatal vitamins (PRENATAL 27-1) 27-1 MG tablet tablet, Take  by mouth., Disp: , Rfl:   •  Semaglutide,0.25 or 0.5MG/DOS, (OZEMPIC) 2 MG/1.5ML solution pen-injector, Inject 0.5 mg under the skin into the appropriate area as directed 1 (One) Time Per Week., Disp: , Rfl:   •  valACYclovir (VALTREX) 1000 MG tablet, As Needed., Disp: , Rfl:   •  valsartan-hydrochlorothiazide (DIOVAN-HCT) 160-12.5 MG per tablet, , Disp: , Rfl: 2  •  vitamin D (ERGOCALCIFEROL) 1.25 MG (29809 UT) capsule capsule, Take 1 capsule by mouth., Disp: , Rfl:      Assessment:        Patient Active Problem List   Diagnosis   •  Echocardiogram abnormal   • Type 1 diabetes mellitus   • Diabetic retinopathy associated with type 1 diabetes mellitus   • Gastroesophageal reflux disease   • Hyperlipidemia   • Chest pain on breathing   • Essential hypertension   • Palpitations   • Chronic kidney disease due to type 1 diabetes mellitus   • Diabetic neuropathy associated with type 1 diabetes mellitus   • Diabetic neuropathy   • Family history of malignant neoplasm of digestive organs   • Family history of malignant neoplasm of digestive organs   • Hiatal hernia   • Lesion of liver   • Positive CHELSEY (antinuclear antibody)   • Presence of insulin pump   • Psoriatic arthritis   • Diabetic retinopathy associated with type 1 diabetes mellitus   • Hypertensive disorder   • Arthritis   • Esophagitis determined by biopsy   • Bloating   • Iron deficiency   • Encounter for screening for malignant neoplasm of colon               Plan:   1.  Hypertension: 132/80 today in office. refilled bisoprolol.  Continue current management    2.  Hyperlipidemia: She reports her PCP manages her cholesterol labs.  Continue current    3.  Obesity:Body mass index is 31.14 kg/m².  Has lost 16 pounds since last year. Significant risk of obesity to CAD, HTN has been explained  Advantages of wt reduction has been explained      4.  Palpitations: Controlled on beta-blockade                 No diagnosis found.    There are no diagnoses linked to this encounter.    COUNSELING: marcelo Antonio was given to patient for the following topics: diagnostic results, risk factor reductions, impressions, risks and benefits of treatment options and importance of treatment compliance .       SMOKING COUNSELING:  denies    Follow up in 1 year or sooner if needed    Sincerely,   MARCELA Diggs  [facility]  04/07/23  11:04 EDT    EMR Dragon/Transcription disclaimer:   Much of this encounter note is an electronic transcription/translation of spoken language to  printed text. The electronic translation of spoken language may permit erroneous, or at times, nonsensical words or phrases to be inadvertently transcribed; Although I have reviewed the note for such errors, some may still exist.

## 2023-06-12 RX ORDER — BISOPROLOL FUMARATE 5 MG/1
TABLET, FILM COATED ORAL
Qty: 90 TABLET | Refills: 3 | Status: SHIPPED | OUTPATIENT
Start: 2023-06-12

## 2023-11-15 ENCOUNTER — PATIENT MESSAGE (OUTPATIENT)
Dept: GASTROENTEROLOGY | Facility: CLINIC | Age: 48
End: 2023-11-15
Payer: COMMERCIAL

## 2023-11-16 RX ORDER — PANTOPRAZOLE SODIUM 40 MG/1
40 TABLET, DELAYED RELEASE ORAL DAILY
Qty: 90 TABLET | Refills: 0 | Status: SHIPPED | OUTPATIENT
Start: 2023-11-16

## 2024-02-12 RX ORDER — PANTOPRAZOLE SODIUM 40 MG/1
40 TABLET, DELAYED RELEASE ORAL DAILY
Qty: 90 TABLET | Refills: 0 | OUTPATIENT
Start: 2024-02-12

## 2024-03-18 RX ORDER — PANTOPRAZOLE SODIUM 40 MG/1
40 TABLET, DELAYED RELEASE ORAL DAILY
Qty: 90 TABLET | Refills: 0 | OUTPATIENT
Start: 2024-03-18

## 2024-03-29 ENCOUNTER — OFFICE VISIT (OUTPATIENT)
Dept: GASTROENTEROLOGY | Facility: CLINIC | Age: 49
End: 2024-03-29
Payer: COMMERCIAL

## 2024-03-29 VITALS
TEMPERATURE: 98.2 F | BODY MASS INDEX: 32.4 KG/M2 | SYSTOLIC BLOOD PRESSURE: 114 MMHG | DIASTOLIC BLOOD PRESSURE: 72 MMHG | OXYGEN SATURATION: 100 % | HEIGHT: 66 IN | WEIGHT: 201.6 LBS | HEART RATE: 88 BPM

## 2024-03-29 DIAGNOSIS — K21.9 GASTROESOPHAGEAL REFLUX DISEASE WITHOUT ESOPHAGITIS: Primary | ICD-10-CM

## 2024-03-29 DIAGNOSIS — K59.00 CONSTIPATION, UNSPECIFIED CONSTIPATION TYPE: ICD-10-CM

## 2024-03-29 RX ORDER — SEMAGLUTIDE 2.4 MG/.75ML
INJECTION, SOLUTION SUBCUTANEOUS
COMMUNITY
Start: 2024-03-28

## 2024-03-29 RX ORDER — PANTOPRAZOLE SODIUM 40 MG/1
40 TABLET, DELAYED RELEASE ORAL DAILY
Qty: 90 TABLET | Refills: 3 | Status: SHIPPED | OUTPATIENT
Start: 2024-03-29

## 2024-03-29 RX ORDER — BLOOD SUGAR DIAGNOSTIC
STRIP MISCELLANEOUS
COMMUNITY
Start: 2023-11-16

## 2024-03-29 NOTE — PATIENT INSTRUCTIONS
For nausea, bloating and fullness     Na has been helpful for some patients.  Gingerroot capsules can be purchased over-the-counter.    Directions: Na root 500 mg (or 550 mg) capsules, take 1 to 2 capsules 3 times daily before meals, max 6 capsules per day.     For GERD:    Follow antireflux precautions:    Continue pantoprazole 40 mg  once daily prior to first meal of the day; can consider starting to take medication every other day and if no return in heartburn symptoms medication can be stopped after 14 days    Avoiding eating within 3 to 4 hours of bedtime.    Avoid foods that can trigger symptoms which may include:  citrus fruits  spicy foods,  Tomatoes  Red sauces   Chocolate  coffee/tea  caffeinated or carbonated beverages  alcohol    For constipation:    Can start over the counter colace and fiber     We recommend starting a daily fiber supplement such as Metamucil, Benefiber, Citrucel       As well as consuming at least 20 to 30 g of dietary fiber per day  This helps to keep stool soft and formed and easy transit throughout the colon to produce regular and complete bowel movements.  When starting fiber regimen recommend starting with a low-dose and gradually increasing by 1 tablespoon every 7 days as tolerated.  This will help your body acclimate to the higher fiber diet and reduce risk of unwanted side effects that include bloating, gas, abdominal fullness, and cramping  For example: Begin taking 1 tablespoon daily for at least 7 days, if this is not successful in producing regular bowel movements can begin taking 1 tablespoons twice a day, and finally if this is not successful after taking 2 tablespoons for 7 days, can further increase to maximum recommended dosing of 1 tablespoon 3 times per day.  It is important to consume increased amounts of fluid throughout the day to help improve the effectiveness of the fiber supplementation  Avoid gummy formulations of fiber as this can further increase  your risk of the above adverse effects due to artificial sweeteners in the Gummies.  Fiber supplements can take 12 to 72 hours to start working. Make sure to drink 6 to 12 ounces of water or noncarbonated beverage with fiber supplement.

## 2024-03-29 NOTE — PROGRESS NOTES
Chief Complaint   Patient presents with    Heartburn    Constipation           History of Present Illness    Patient is a 48 y.o. who presents to the office for follow up evaluation.  Last in office visit was on  10/13/2021 with MARCELA Duke . Patient has a significant past medical history of GERD with esophagitis, cholelithiasis, liver hemangioma.  Other significant past medical history includes type 1 diabetes mellitus and psoriatic arthritis.    9/29/2022 EGD and Colonoscopy:    Irregular Z-line  Negative for Trivedi's esophagus or EOE  Gastritis  Biopsies consistent with mild chronic inactive gastritis without H. pylori or intestinal metaplasia  Endoscopically normal-appearing duodenum and esophagus    Colonoscopy:     3 mm hyperplastic polyp in the descending colon  Non-bleeding internal hemorrhoids.  Quality of bowel preparation described as good    Next colonoscopy for colon cancer screening recommended at 5-year interval due to 9/29/2027    For GERD, patient continues on pantoprazole 40 mg once daily prior to first meal of the day.  States that when pantoprazole is taken daily symptoms are well-controlled without heartburn, nausea, vomiting, or dysphagia.  However she did miss dosing for 48 hours and did notice a recurrence and heartburn symptoms.    Denies use of NSAIDs, tobacco, with occasional wine consumption.    Bowel habits are described as occurring daily to every other day that are a pasty consistency and difficult to maintain personal hygiene.  She also reports frequent incomplete evacuation of stool sensation.  Denies melena or hematochezia.  She is not currently taking fiber supplementation or bowel regimen at this time.    No unintentional weight loss.    Patient has positive family history of ulcerative colitis in sister    Patient denies known family history of colon polyps, colon cancer       Result Review :       UPPER GI ENDOSCOPY (09/29/2022 09:20)  COLONOSCOPY (09/29/2022  "09:12)  Tissue Pathology Exam (09/29/2022 09:31)     Vital Signs:   /72   Pulse 88   Temp 98.2 °F (36.8 °C)   Ht 166.4 cm (65.5\")   Wt 91.4 kg (201 lb 9.6 oz)   SpO2 100%   BMI 33.04 kg/m²     Body mass index is 33.04 kg/m².     Physical Exam  Vitals reviewed.   Constitutional:       General: She is not in acute distress.     Appearance: Normal appearance. She is not ill-appearing.   Eyes:      General: No scleral icterus.  Pulmonary:      Effort: Pulmonary effort is normal. No respiratory distress.   Abdominal:      General: Bowel sounds are normal. There is no distension.      Palpations: Abdomen is soft. Abdomen is not rigid. There is no mass or pulsatile mass.      Tenderness: There is no abdominal tenderness. There is no guarding or rebound.      Hernia: No hernia is present.   Skin:     Coloration: Skin is not jaundiced.   Neurological:      Mental Status: She is alert and oriented to person, place, and time.   Psychiatric:         Thought Content: Thought content normal.         Judgment: Judgment normal.           Assessment and Plan    Diagnoses and all orders for this visit:    1. Gastroesophageal reflux disease without esophagitis (Primary)  -     pantoprazole (PROTONIX) 40 MG EC tablet; Take 1 tablet by mouth Daily.  Dispense: 90 tablet; Refill: 3    2. Constipation, unspecified constipation type           Discussion:    Patient is a pleasant 48-year-old female who presents today for management of heartburn and constipation.  Discussed recommendations to continue with pantoprazole 40 mg once daily as this is currently controlling symptoms.      We also discussed possible trial off of medication beginning with every other day dosing x 14 days.  Patient is agreeable with this plan and will consider slow titration off medication to see if medication can be discontinued and reduce risk of associated long-term risk including but not limited to vitamin deficiencies that include iron, magnesium, B12 " as well as risk of gastric polyps, C. difficile.     For bowel movements, recommend starting fiber supplementation in hopes of improving stool consistency and completeness of bowel movements as well as Colace 1 capsule nightly to improve bowel frequency and completeness.    She will follow-up in our office in 12 months or sooner for any new or worsening GI concerns.  Informed patient of next colonoscopy for colon cancer screening due in September 2027.  Recall active in EMR.    Patient is agreeable to the outlined above treatment plan.  Verbalizes understanding and will contact office for any new or worsening concerns.  All questions answered and support provided.        Patient Instructions   For nausea, bloating and fullness     Na has been helpful for some patients.  Gingerroot capsules can be purchased over-the-counter.    Directions: Na root 500 mg (or 550 mg) capsules, take 1 to 2 capsules 3 times daily before meals, max 6 capsules per day.     For GERD:    Follow antireflux precautions:    Continue pantoprazole 40 mg  once daily prior to first meal of the day; can consider starting to take medication every other day and if no return in heartburn symptoms medication can be stopped after 14 days    Avoiding eating within 3 to 4 hours of bedtime.    Avoid foods that can trigger symptoms which may include:  citrus fruits  spicy foods,  Tomatoes  Red sauces   Chocolate  coffee/tea  caffeinated or carbonated beverages  alcohol    For constipation:    Can start over the counter colace and fiber     We recommend starting a daily fiber supplement such as Metamucil, Benefiber, Citrucel       As well as consuming at least 20 to 30 g of dietary fiber per day  This helps to keep stool soft and formed and easy transit throughout the colon to produce regular and complete bowel movements.  When starting fiber regimen recommend starting with a low-dose and gradually increasing by 1 tablespoon every 7 days as  tolerated.  This will help your body acclimate to the higher fiber diet and reduce risk of unwanted side effects that include bloating, gas, abdominal fullness, and cramping  For example: Begin taking 1 tablespoon daily for at least 7 days, if this is not successful in producing regular bowel movements can begin taking 1 tablespoons twice a day, and finally if this is not successful after taking 2 tablespoons for 7 days, can further increase to maximum recommended dosing of 1 tablespoon 3 times per day.  It is important to consume increased amounts of fluid throughout the day to help improve the effectiveness of the fiber supplementation  Avoid gummy formulations of fiber as this can further increase your risk of the above adverse effects due to artificial sweeteners in the Gummies.  Fiber supplements can take 12 to 72 hours to start working. Make sure to drink 6 to 12 ounces of water or noncarbonated beverage with fiber supplement.               EMR Dragon/Transcription Disclaimer:  This document has been Dictated utilizing Dragon dictation.

## 2024-04-05 ENCOUNTER — OFFICE VISIT (OUTPATIENT)
Dept: CARDIOLOGY | Facility: CLINIC | Age: 49
End: 2024-04-05
Payer: COMMERCIAL

## 2024-04-05 VITALS
HEIGHT: 66 IN | DIASTOLIC BLOOD PRESSURE: 75 MMHG | HEART RATE: 88 BPM | BODY MASS INDEX: 31.34 KG/M2 | SYSTOLIC BLOOD PRESSURE: 130 MMHG | WEIGHT: 195 LBS

## 2024-04-05 DIAGNOSIS — I10 ESSENTIAL HYPERTENSION: Primary | ICD-10-CM

## 2024-04-05 DIAGNOSIS — R00.2 PALPITATIONS: ICD-10-CM

## 2024-04-05 DIAGNOSIS — E78.00 PURE HYPERCHOLESTEROLEMIA: ICD-10-CM

## 2024-04-05 PROCEDURE — 93000 ELECTROCARDIOGRAM COMPLETE: CPT

## 2024-04-05 PROCEDURE — 99214 OFFICE O/P EST MOD 30 MIN: CPT

## 2024-04-05 RX ORDER — BISOPROLOL FUMARATE 5 MG/1
5 TABLET, FILM COATED ORAL DAILY
Qty: 90 TABLET | Refills: 3 | Status: SHIPPED | OUTPATIENT
Start: 2024-04-05

## 2024-04-05 NOTE — PROGRESS NOTES
Subjective:        Barbara Pacheco is a 48 y.o. female who here for follow up    No chief complaint on file.      HPI  .  This is a 48-year-old female who is known with this provider with hypertension, hyperlipidemia, palpitations, diabetes mellitus type 1, CKD.  She follows up in office today for management of palpitations and hypertension.    Reviewed and still relevant: Echo 3/29/2022 EF 65%, normal LV function.  Stress test 3/29/2022 was a normal myocardial perfusion study with no evidence of ischemia.  Small lateral wall defect seen on polar view appears to be due to artifact.    The following portions of the patient's history were reviewed and updated as appropriate: allergies, current medications, past family history, past medical history, past social history, past surgical history and problem list.    Past Medical History:   Diagnosis Date    Abdominal pain     Abnormal stress echo     Allergic rhinitis     Arthritis     Chest pain     Chronic kidney disease     Diabetes mellitus type 1     Esophagitis     False positive cardiac stress test     Family history of colon cancer     GERD (gastroesophageal reflux disease)     High blood pressure     HLD (hyperlipidemia)     Liver lesion     Neuropathy 03/29/2022    both feet         reports that she has never smoked. She has never used smokeless tobacco. She reports current alcohol use. She reports that she does not use drugs.     Family History   Problem Relation Age of Onset    Diabetes Mother     Diabetes Father     Heart attack Father     Hypertension Father     Diabetes Sister     Ulcerative colitis Sister     Hyperlipidemia Paternal Grandfather     Hypertension Paternal Grandfather     Colon polyps Neg Hx     Crohn's disease Neg Hx     Colon cancer Neg Hx     Irritable bowel syndrome Neg Hx        ROS     Review of Systems  Constitutional: No wt loss, fever, fatigue  Gastrointestinal: No nausea, abdominal pain  Behavioral/Psych: No insomnia or  anxiety  Cardiovascular: No chest pain or shortness of breath      Objective:           Vitals and nursing note reviewed.   Constitutional:       Appearance: Well-developed.   HENT:      Head: Normocephalic.      Right Ear: External ear normal.      Left Ear: External ear normal.   Neck:      Vascular: No JVD.   Pulmonary:      Effort: Pulmonary effort is normal. No respiratory distress.      Breath sounds: Normal breath sounds. No stridor. No rales.   Cardiovascular:      Normal rate. Regular rhythm.      No gallop.    Pulses:     Intact distal pulses.   Edema:     Peripheral edema absent.   Abdominal:      General: Bowel sounds are normal. There is no distension.      Palpations: Abdomen is soft.      Tenderness: There is no abdominal tenderness. There is no guarding.   Musculoskeletal: Normal range of motion.         General: No tenderness.      Cervical back: Normal range of motion. Skin:     General: Skin is warm.   Neurological:      Mental Status: Alert and oriented to person, place, and time.      Deep Tendon Reflexes: Reflexes are normal and symmetric.   Psychiatric:         Judgment: Judgment normal.           ECG 12 Lead    Date/Time: 4/5/2024 2:54 PM  Performed by: Shari Hale APRN    Authorized by: Shari Hale APRN  Comparison: compared with previous ECG from 4/7/2023  Similar to previous ECG  Rhythm: sinus rhythm  Rate: normal  BPM: 83    Clinical impression: normal ECG        Interpretation Summary    Calculated EF = 64%  There is no evidence of pericardial effusion.    Interpretation Summary       Moderate risk for ischemic heart disease.  Findings consistent with a normal ECG stress test.  Left ventricular ejection fraction is normal (Calculated EF = 60%).  Myocardial perfusion imaging indicates a normal myocardial perfusion study with no evidence of ischemia.  Impressions are consistent with a low risk study.           Current Outpatient Medications:     amitriptyline (ELAVIL) 25 MG  tablet, Take 1 tablet by mouth Every Night., Disp: , Rfl:     Biotin 5 MG capsule, Take  by mouth., Disp: , Rfl:     bisoprolol (ZEBeta) 5 MG tablet, TAKE 1 TABLET BY MOUTH EVERY DAY, Disp: 90 tablet, Rfl: 3    Cetirizine HCl 10 MG capsule, Take 1 tablet by mouth Daily., Disp: , Rfl:     Continuous Blood Gluc Sensor (DEXCOM G6 SENSOR), USE AS DIRECTED AND CHANGE EVERY 10 DAYS, Disp: , Rfl:     Continuous Blood Gluc Transmit (DEXCOM G6 TRANSMITTER) misc, See Admin Instructions., Disp: , Rfl:     desonide (DESOWEN) 0.05 % cream, , Disp: , Rfl:     etonogestrel-ethinyl estradiol (NUVARING) 0.12-0.015 MG/24HR vaginal ring, , Disp: , Rfl:     glucose blood (OneTouch Ultra) test strip, Use to CBG QID E10.9, Disp: , Rfl:     insulin glargine (LANTUS, SEMGLEE) 100 UNIT/ML injection, Inject 60 Units under the skin into the appropriate area as directed., Disp: , Rfl:     Insulin Infusion Pump device, Inject 2.7 Units/hr under the skin., Disp: , Rfl:     Insulin Lispro (HumaLOG) 100 UNIT/ML solution cartridge, Patient's insurance switches back and forth from Novolog to Humalog, Disp: , Rfl:     nystatin-triamcinolone (MYCOLOG) 803335-3.1 UNIT/GM-% ointment, APPLY TWICE DAILY X 4 5DAYS, THEN ONCE DAILY X4 5 DAYS, THEN EVERY OTHER DAY X 4 5D, Disp: , Rfl:     pantoprazole (PROTONIX) 40 MG EC tablet, Take 1 tablet by mouth Daily., Disp: 90 tablet, Rfl: 3    prenatal vitamins (PRENATAL 27-1) 27-1 MG tablet tablet, Take  by mouth., Disp: , Rfl:     Semaglutide,0.25 or 0.5MG/DOS, (OZEMPIC) 2 MG/1.5ML solution pen-injector, Inject 0.5 mg under the skin into the appropriate area as directed 1 (One) Time Per Week. Patient on last two months of Ozempic.  Insurance will no longer cover it.  Trying to obtain approval for Wegovy., Disp: , Rfl:     Semaglutide-Weight Management (Wegovy) 2.4 MG/0.75ML solution auto-injector, Use 2.4 mg once weekly, Disp: , Rfl:     valACYclovir (VALTREX) 1000 MG tablet, As Needed., Disp: , Rfl:      valsartan-hydrochlorothiazide (DIOVAN-HCT) 160-12.5 MG per tablet, , Disp: , Rfl: 2    vitamin D (ERGOCALCIFEROL) 1.25 MG (93907 UT) capsule capsule, Take 1 capsule by mouth., Disp: , Rfl:      Assessment:        Patient Active Problem List   Diagnosis    Echocardiogram abnormal    Type 1 diabetes mellitus    Diabetic retinopathy associated with type 1 diabetes mellitus    Gastroesophageal reflux disease    Hyperlipidemia    Chest pain on breathing    Essential hypertension    Palpitations    Chronic kidney disease due to type 1 diabetes mellitus    Diabetic neuropathy associated with type 1 diabetes mellitus    Diabetic neuropathy    Family history of malignant neoplasm of digestive organs    Hiatal hernia    Lesion of liver    Positive CHELSEY (antinuclear antibody)    Presence of insulin pump    Psoriatic arthritis    Diabetic retinopathy associated with type 1 diabetes mellitus    Hypertensive disorder    Arthritis    Esophagitis determined by biopsy    Bloating    Iron deficiency    Encounter for screening for malignant neoplasm of colon    Constipation               Plan:   1.  Hypertension: BP today in office 130/75, Controlled.  Refilled bisoprolol, valsartan HCTZ.    Importance of controlling hypertension and blood pressure  checkup on the regular basis has been explained. Hypertension as a silent killer has been discussed. Risk reduction of the weight and regular exercises to control the hypertension has been explained.    2.  Hyperlipidemia: She reports her PCP manages her cholesterol labs, continue current management.    Risk of the hyperlipidemia, importance of the treatment has been explained. Pros and cons of the statins has been explained. Regular blood workup as well as side effects including the liver failure, myelopathy death has been explained.    3.  Palpitations: Controlled on beta-blockade.                 No diagnosis found.    There are no diagnoses linked to this encounter.    COUNSELING:  marcelo Antonio was given to patient for the following topics: diagnostic results, risk factor reductions, impressions, risks and benefits of treatment options and importance of treatment compliance .       SMOKING COUNSELING: denies    Follow up in 1 year or sooner if needed    Sincerely,   MARCELA Diggs  [facility]  04/05/24  14:29 EDT    EMR Dragon/Transcription disclaimer:   Much of this encounter note is an electronic transcription/translation of spoken language to printed text. The electronic translation of spoken language may permit erroneous, or at times, nonsensical words or phrases to be inadvertently transcribed; Although I have reviewed the note for such errors, some may still exist.

## 2024-11-13 ENCOUNTER — TELEPHONE (OUTPATIENT)
Dept: GASTROENTEROLOGY | Facility: CLINIC | Age: 49
End: 2024-11-13
Payer: COMMERCIAL

## 2025-02-17 DIAGNOSIS — K21.9 GASTROESOPHAGEAL REFLUX DISEASE WITHOUT ESOPHAGITIS: ICD-10-CM

## 2025-02-17 RX ORDER — PANTOPRAZOLE SODIUM 40 MG/1
40 TABLET, DELAYED RELEASE ORAL DAILY
Qty: 90 TABLET | Refills: 0 | Status: SHIPPED | OUTPATIENT
Start: 2025-02-17

## 2025-04-07 ENCOUNTER — OFFICE VISIT (OUTPATIENT)
Dept: CARDIOLOGY | Facility: CLINIC | Age: 50
End: 2025-04-07
Payer: COMMERCIAL

## 2025-04-07 VITALS
HEIGHT: 66 IN | WEIGHT: 203 LBS | HEART RATE: 76 BPM | BODY MASS INDEX: 32.62 KG/M2 | DIASTOLIC BLOOD PRESSURE: 82 MMHG | SYSTOLIC BLOOD PRESSURE: 138 MMHG

## 2025-04-07 DIAGNOSIS — E78.00 PURE HYPERCHOLESTEROLEMIA: ICD-10-CM

## 2025-04-07 DIAGNOSIS — R00.2 PALPITATIONS: Primary | ICD-10-CM

## 2025-04-07 PROCEDURE — 93000 ELECTROCARDIOGRAM COMPLETE: CPT | Performed by: INTERNAL MEDICINE

## 2025-04-07 PROCEDURE — 99213 OFFICE O/P EST LOW 20 MIN: CPT | Performed by: INTERNAL MEDICINE

## 2025-04-07 RX ORDER — TIRZEPATIDE 10 MG/.5ML
10 INJECTION, SOLUTION SUBCUTANEOUS
COMMUNITY
Start: 2025-04-07

## 2025-04-07 NOTE — PROGRESS NOTES
1 YR FOLLOW UP    Subjective:        Barbara Pacheco is a 49 y.o. female who here for follow up    CC  TIRED  HPI  49-year-old female here for the follow-up has been feeling weak and tired has a history of palpitation     Problems Addressed this Visit          Cardiac and Vasculature    Hyperlipidemia    Palpitations - Primary    Relevant Orders    Treadmill Stress Test     Diagnoses         Codes Comments      Palpitations    -  Primary ICD-10-CM: R00.2  ICD-9-CM: 785.1       Pure hypercholesterolemia     ICD-10-CM: E78.00  ICD-9-CM: 272.0           .    The following portions of the patient's history were reviewed and updated as appropriate: allergies, current medications, past family history, past medical history, past social history, past surgical history and problem list.    Past Medical History:   Diagnosis Date    Abdominal pain     Abnormal stress echo     Allergic rhinitis     Arthritis     Chest pain     Chronic kidney disease     Diabetes mellitus type 1     Esophagitis     False positive cardiac stress test     Family history of colon cancer     GERD (gastroesophageal reflux disease)     High blood pressure     HLD (hyperlipidemia)     Liver lesion     Neuropathy 03/29/2022    both feet     reports that she has never smoked. She has never been exposed to tobacco smoke. She has never used smokeless tobacco. She reports current alcohol use. She reports that she does not use drugs.   Family History   Problem Relation Age of Onset    Diabetes Mother     Diabetes Father     Heart attack Father     Hypertension Father     Diabetes Sister     Ulcerative colitis Sister     Hyperlipidemia Paternal Grandfather     Hypertension Paternal Grandfather     Colon polyps Neg Hx     Crohn's disease Neg Hx     Colon cancer Neg Hx     Irritable bowel syndrome Neg Hx        Review of Systems  Constitutional: No wt loss, fever, fatigue  Gastrointestinal: No nausea, abdominal pain  Behavioral/Psych: No insomnia or anxiety    "Cardiovascular weak and tired  Objective:       Physical Exam  /82   Pulse 76   Ht 166.4 cm (65.5\")   Wt 92.1 kg (203 lb)   BMI 33.27 kg/m²   General appearance: No acute changes   Neck: Trachea midline; NECK, supple, no thyromegaly or lymphadenopathy   Lungs: Normal size and shape, normal breath sounds, equal distribution of air, no rales and rhonchi   CV: S1-S2 regular, no murmurs, no rub, no gallop   Abdomen: Soft, nontender; no masses , no abnormal abdominal sounds   Extremities: No deformity , normal color , no peripheral edema   Skin: Normal temperature, turgor and texture; no rash, ulcers            ECG 12 Lead    Date/Time: 4/7/2025 2:14 PM  Performed by: Mookie Alejandro MD    Authorized by: Mookie Alejandro MD  Comparison: compared with previous ECG   Similar to previous ECG  Rhythm: sinus rhythm    Clinical impression: non-specific ECG            Echocardiogram:    Results for orders placed in visit on 03/08/22    Adult Transthoracic Echo Complete W/ Cont if Necessary Per Protocol    Interpretation Summary  · Estimated right ventricular systolic pressure from tricuspid regurgitation is normal (<35 mmHg).  · Calculated left ventricular EF = 65.3% Estimated left ventricular EF was in agreement with the calculated left ventricular EF.  · Left ventricular diastolic function was normal.  · There is no evidence of pericardial effusion. .          Current Outpatient Medications:     amitriptyline (ELAVIL) 25 MG tablet, Take 1 tablet by mouth Every Night., Disp: , Rfl:     Biotin 5 MG capsule, Take  by mouth., Disp: , Rfl:     bisoprolol (ZEBeta) 5 MG tablet, Take 1 tablet by mouth Daily., Disp: 90 tablet, Rfl: 3    Cetirizine HCl 10 MG capsule, Take 1 tablet by mouth Daily., Disp: , Rfl:     Continuous Blood Gluc Sensor (DEXCOM G6 SENSOR), USE AS DIRECTED AND CHANGE EVERY 10 DAYS, Disp: , Rfl:     Continuous Blood Gluc Transmit (DEXCOM G6 TRANSMITTER) misc, See Admin Instructions., Disp: , " Rfl:     desonide (DESOWEN) 0.05 % cream, , Disp: , Rfl:     etonogestrel-ethinyl estradiol (NUVARING) 0.12-0.015 MG/24HR vaginal ring, , Disp: , Rfl:     glucose blood (OneTouch Ultra) test strip, Use to CBG QID E10.9, Disp: , Rfl:     insulin glargine (LANTUS, SEMGLEE) 100 UNIT/ML injection, Inject 60 Units under the skin into the appropriate area as directed., Disp: , Rfl:     Insulin Infusion Pump device, Inject 2.7 Units/hr under the skin., Disp: , Rfl:     Insulin Lispro (HumaLOG) 100 UNIT/ML solution cartridge, Patient's insurance switches back and forth from Novolog to Humalog, Disp: , Rfl:     nystatin-triamcinolone (MYCOLOG) 113834-5.1 UNIT/GM-% ointment, APPLY TWICE DAILY X 4 5DAYS, THEN ONCE DAILY X4 5 DAYS, THEN EVERY OTHER DAY X 4 5D, Disp: , Rfl:     pantoprazole (PROTONIX) 40 MG EC tablet, Take 1 tablet by mouth Daily., Disp: 90 tablet, Rfl: 0    prenatal vitamins (PRENATAL 27-1) 27-1 MG tablet tablet, Take  by mouth., Disp: , Rfl:     Tirzepatide-Weight Management (Zepbound) 10 MG/0.5ML solution, Inject 0.5 mL under the skin into the appropriate area as directed., Disp: , Rfl:     valACYclovir (VALTREX) 1000 MG tablet, As Needed., Disp: , Rfl:     valsartan-hydrochlorothiazide (DIOVAN-HCT) 160-12.5 MG per tablet, , Disp: , Rfl: 2    vitamin D (ERGOCALCIFEROL) 1.25 MG (72997 UT) capsule capsule, Take 1 capsule by mouth., Disp: , Rfl:    Assessment:                Plan:          ICD-10-CM ICD-9-CM   1. Palpitations  R00.2 785.1   2. Pure hypercholesterolemia  E78.00 272.0     1. Palpitations  Considering the patient's symptoms as well as clinical situation and  EKG findings, along with cardiac risk factors, ischemic workup is necessary to rule out ischemic cardiomyopathy, stress induced arrhythmias, and functional capacity for diagnosis as well as prognostic consideration    - Treadmill Stress Test; Future    2. Pure hypercholesterolemia  Continue current treatment      ETT IF OK 1  YR  COUNSELING:    Barbara Antonio was given to patient for the following topics: diagnostic results, risk factor reductions, impressions, risks and benefits of treatment options and importance of treatment compliance .       SMOKING COUNSELING:        Dictated using Dragon dictation

## 2025-04-25 ENCOUNTER — HOSPITAL ENCOUNTER (OUTPATIENT)
Dept: CARDIOLOGY | Facility: HOSPITAL | Age: 50
Discharge: HOME | End: 2025-04-25
Admitting: INTERNAL MEDICINE
Payer: COMMERCIAL

## 2025-04-25 DIAGNOSIS — R00.2 PALPITATIONS: ICD-10-CM

## 2025-04-25 LAB
BH CV STRESS BP STAGE 1: NORMAL
BH CV STRESS BP STAGE 2: NORMAL
BH CV STRESS BP STAGE 3: NORMAL
BH CV STRESS DURATION MIN STAGE 1: 3
BH CV STRESS DURATION MIN STAGE 2: 3
BH CV STRESS DURATION SEC STAGE 1: 0
BH CV STRESS DURATION SEC STAGE 2: 0
BH CV STRESS DURATION SEC STAGE 3: 55
BH CV STRESS GRADE STAGE 1: 10
BH CV STRESS GRADE STAGE 2: 12
BH CV STRESS GRADE STAGE 3: 14
BH CV STRESS HR STAGE 1: 129
BH CV STRESS HR STAGE 2: 150
BH CV STRESS HR STAGE 3: 158
BH CV STRESS METS STAGE 1: 4.6
BH CV STRESS METS STAGE 2: 7.1
BH CV STRESS METS STAGE 3: 8.4
BH CV STRESS PROTOCOL 1: NORMAL
BH CV STRESS RECOVERY BP: NORMAL MMHG
BH CV STRESS RECOVERY HR: 108 BPM
BH CV STRESS SPEED STAGE 1: 1.7
BH CV STRESS SPEED STAGE 2: 2.5
BH CV STRESS SPEED STAGE 3: 3.4
BH CV STRESS STAGE 1: 1
BH CV STRESS STAGE 2: 2
BH CV STRESS STAGE 3: 3
MAXIMAL PREDICTED HEART RATE: 171 BPM
PERCENT MAX PREDICTED HR: 93.57 %
STRESS BASELINE BP: NORMAL MMHG
STRESS BASELINE HR: 90 BPM
STRESS O2 SAT REST: 99 %
STRESS PERCENT HR: 110 %
STRESS POST ESTIMATED WORKLOAD: 8.4 METS
STRESS POST EXERCISE DUR MIN: 6 MIN
STRESS POST EXERCISE DUR SEC: 55 SEC
STRESS POST PEAK BP: NORMAL MMHG
STRESS POST PEAK HR: 160 BPM
STRESS TARGET HR: 145 BPM

## 2025-04-25 PROCEDURE — 93017 CV STRESS TEST TRACING ONLY: CPT

## 2025-05-19 ENCOUNTER — TELEPHONE (OUTPATIENT)
Dept: GASTROENTEROLOGY | Facility: CLINIC | Age: 50
End: 2025-05-19
Payer: COMMERCIAL

## 2025-05-23 NOTE — TELEPHONE ENCOUNTER
Caller: DHAVAL     Relationship to patient:   [employer]     Best call back number: [phone]     Provider: MAGI MEDRANO     Medication PA needed: pantoprazole (PROTONIX) 40 MG EC tablet     Reason for call/Prior Auth: BECAUSE THERE IS A QUANTITY LIMIT ON HER PLAN     
PA APPROVED  
07-Dec-2021

## 2025-05-28 ENCOUNTER — TELEPHONE (OUTPATIENT)
Dept: GASTROENTEROLOGY | Facility: CLINIC | Age: 50
End: 2025-05-28
Payer: COMMERCIAL

## 2025-06-03 NOTE — PROGRESS NOTES
"Chief Complaint   Patient presents with    Heartburn         History of Present Illness  Patient is a 50-year-old female who presents today for follow-up.  She has a history of GERD with esophagitis, cholelithiasis, liver hemangioma, and internal hemorrhoids.  She will be due for colon cancer screening in 2027.    Patient presents today for follow-up.  She continues on pantoprazole for GERD.  Symptoms are well-controlled but recently ran out of medication and had heartburn when she is out of the medication.  As long as she is taking medicine she feels symptoms are controlled with no significant breakthrough.  She denies any nausea or vomiting.  Denies any dysphagia.    Denies any abdominal pain.    Denies any bowel complaints.     Result Review :       Tissue Pathology Exam (09/29/2022 09:31)    COLONOSCOPY (09/29/2022 09:12)    UPPER GI ENDOSCOPY (09/29/2022 09:20)    Office Visit with Rosie Ernst APRN (03/29/2024)    Vital Signs:   /70   Pulse 70   Temp 97.7 °F (36.5 °C)   Ht 166.4 cm (65.5\")   Wt 87.5 kg (193 lb)   SpO2 100%   BMI 31.63 kg/m²     Body mass index is 31.63 kg/m².     Physical Exam  Vitals reviewed.   Constitutional:       General: She is not in acute distress.     Appearance: She is well-developed.   HENT:      Head: Normocephalic and atraumatic.   Pulmonary:      Effort: Pulmonary effort is normal. No respiratory distress.   Abdominal:      General: Abdomen is flat. Bowel sounds are normal. There is no distension.      Palpations: Abdomen is soft.      Tenderness: There is no abdominal tenderness.   Skin:     General: Skin is dry.      Coloration: Skin is not pale.   Neurological:      Mental Status: She is alert and oriented to person, place, and time.   Psychiatric:         Thought Content: Thought content normal.           Assessment and Plan    Diagnoses and all orders for this visit:    1. Gastroesophageal reflux disease without esophagitis (Primary)  -     pantoprazole " (PROTONIX) 40 MG EC tablet; Take 1 tablet by mouth Daily.  Dispense: 90 tablet; Refill: 3    2. Family history of colon cancer    3. Calculus of gallbladder without cholecystitis without obstruction         Discussion  Patient resents today for follow-up of GERD.  Symptoms are stable and controlled on pantoprazole and will continue current treatment.    She has a history of cholelithiasis, she is asymptomatic from this and recommended continued monitoring.  She was instructed to notify the office if she develops any abdominal pain.    Colon cancer screening is up-to-date, she will be due for her next colonoscopy in 2027 due to family history of colon cancer.    Will plan on follow-up in 1 year and she was instructed to call with any new or worsening symptoms.          Follow Up   Return in about 1 year (around 6/4/2026).    There are no Patient Instructions on file for this visit.

## 2025-06-04 ENCOUNTER — OFFICE VISIT (OUTPATIENT)
Dept: GASTROENTEROLOGY | Facility: CLINIC | Age: 50
End: 2025-06-04
Payer: COMMERCIAL

## 2025-06-04 VITALS
TEMPERATURE: 97.7 F | HEART RATE: 70 BPM | SYSTOLIC BLOOD PRESSURE: 114 MMHG | BODY MASS INDEX: 31.02 KG/M2 | DIASTOLIC BLOOD PRESSURE: 70 MMHG | HEIGHT: 66 IN | WEIGHT: 193 LBS | OXYGEN SATURATION: 100 %

## 2025-06-04 DIAGNOSIS — Z80.0 FAMILY HISTORY OF COLON CANCER: ICD-10-CM

## 2025-06-04 DIAGNOSIS — K80.20 CALCULUS OF GALLBLADDER WITHOUT CHOLECYSTITIS WITHOUT OBSTRUCTION: ICD-10-CM

## 2025-06-04 DIAGNOSIS — K21.9 GASTROESOPHAGEAL REFLUX DISEASE WITHOUT ESOPHAGITIS: Primary | ICD-10-CM

## 2025-06-04 PROCEDURE — 99213 OFFICE O/P EST LOW 20 MIN: CPT | Performed by: NURSE PRACTITIONER

## 2025-06-04 RX ORDER — INSULIN ASPART 100 [IU]/ML
INJECTION, SOLUTION INTRAVENOUS; SUBCUTANEOUS
COMMUNITY
Start: 2025-05-27

## 2025-06-04 RX ORDER — TIRZEPATIDE 15 MG/.5ML
INJECTION, SOLUTION SUBCUTANEOUS
COMMUNITY
Start: 2025-04-25

## 2025-06-04 RX ORDER — PANTOPRAZOLE SODIUM 40 MG/1
40 TABLET, DELAYED RELEASE ORAL DAILY
Qty: 90 TABLET | Refills: 3 | Status: SHIPPED | OUTPATIENT
Start: 2025-06-04

## 2025-07-21 RX ORDER — BISOPROLOL FUMARATE 5 MG/1
5 TABLET, FILM COATED ORAL DAILY
Qty: 90 TABLET | Refills: 3 | Status: SHIPPED | OUTPATIENT
Start: 2025-07-21

## (undated) DEVICE — KT ORCA ORCAPOD DISP STRL

## (undated) DEVICE — FLEX ADVANTAGE 1500CC: Brand: FLEX ADVANTAGE

## (undated) DEVICE — GOWN PROC ENDOARMOR GI LVL3 HY/SHLD UNIV

## (undated) DEVICE — GOWN ISOL W/THUMB UNIV BLU BX/15

## (undated) DEVICE — VIAL FORMLN CAP 10PCT 20ML

## (undated) DEVICE — CANN NASL CO2 TRULINK W/O2 A/

## (undated) DEVICE — BITEBLOCK OMNI BLOC

## (undated) DEVICE — Device

## (undated) DEVICE — SINGLE-USE BIOPSY FORCEPS: Brand: RADIAL JAW 4